# Patient Record
Sex: MALE | Race: WHITE | NOT HISPANIC OR LATINO
[De-identification: names, ages, dates, MRNs, and addresses within clinical notes are randomized per-mention and may not be internally consistent; named-entity substitution may affect disease eponyms.]

---

## 2018-07-07 ENCOUNTER — LAB SERVICES (OUTPATIENT)
Dept: OTHER | Age: 43
End: 2018-07-07

## 2018-07-07 ENCOUNTER — CHARTING TRANS (OUTPATIENT)
Dept: OTHER | Age: 43
End: 2018-07-07

## 2018-07-11 ENCOUNTER — CHARTING TRANS (OUTPATIENT)
Dept: OTHER | Age: 43
End: 2018-07-11

## 2018-07-11 LAB
APPEARANCE UR: CLEAR
BASOPHILS # BLD: 0 K/MCL (ref 0–0.3)
BASOPHILS NFR BLD: 1 %
BILIRUB UR QL: NEGATIVE
COLOR UR: YELLOW
DIFFERENTIAL METHOD BLD: ABNORMAL
EOSINOPHIL # BLD: 0.1 K/MCL (ref 0.1–0.5)
EOSINOPHIL NFR BLD: 1 %
ERYTHROCYTE [DISTWIDTH] IN BLOOD: 13.2 % (ref 11–15)
GLUCOSE UR-MCNC: NEGATIVE MG/DL
HEMATOCRIT: 59.8 % (ref 39–51)
HEMOGLOBIN: 18.4 G/DL (ref 13–17)
IMM GRANULOCYTES # BLD AUTO: 0.1 K/MCL (ref 0–0.2)
IMM GRANULOCYTES NFR BLD: 1 %
KETONES UR-MCNC: NEGATIVE MG/DL
LYMPHOCYTES # BLD: 2.2 K/MCL (ref 1–4.8)
LYMPHOCYTES NFR BLD: 28 %
MEAN CORPUSCULAR HEMOGLOBIN: 29.3 PG (ref 26–34)
MEAN CORPUSCULAR HGB CONC: 30.8 G/DL (ref 32–36.5)
MEAN CORPUSCULAR VOLUME: 95.2 FL (ref 78–100)
MONOCYTES # BLD: 0.8 K/MCL (ref 0.3–0.9)
MONOCYTES NFR BLD: 10 %
NEUTROPHILS # BLD: 4.8 K/MCL (ref 1.8–7.7)
NEUTROPHILS NFR BLD: 59 %
NITRITE UR QL: NEGATIVE
NRBC (NRBCRE): 0 /100 WBC
PH UR: 7 UNITS (ref 5–7)
PLATELET COUNT: 259 K/MCL (ref 140–450)
PROT UR QL: NEGATIVE MG/DL
RBC-URINE: NEGATIVE
RED CELL COUNT: 6.28 MIL/MCL (ref 4.5–5.9)
SERVICE CMNT-IMP: NORMAL
SP GR UR: 1.01 (ref 1–1.03)
SPECIMEN SOURCE: NORMAL
UROBILINOGEN UR QL: 0.2 MG/DL (ref 0–1)
WBC-URINE: NEGATIVE
WHITE BLOOD COUNT: 8 K/MCL (ref 4.2–11)

## 2018-07-25 ENCOUNTER — HOSPITAL (OUTPATIENT)
Dept: OTHER | Age: 43
End: 2018-07-25
Attending: FAMILY MEDICINE

## 2018-08-08 ENCOUNTER — HOSPITAL (OUTPATIENT)
Dept: OTHER | Age: 43
End: 2018-08-08
Attending: FAMILY MEDICINE

## 2018-09-10 ENCOUNTER — LAB SERVICES (OUTPATIENT)
Dept: OTHER | Age: 43
End: 2018-09-10

## 2018-09-10 ENCOUNTER — CHARTING TRANS (OUTPATIENT)
Dept: OTHER | Age: 43
End: 2018-09-10

## 2018-09-10 LAB — RAPID STREP GROUP A: POSITIVE

## 2018-09-10 ASSESSMENT — PAIN SCALES - GENERAL: PAINLEVEL_OUTOF10: 4

## 2018-10-31 VITALS
SYSTOLIC BLOOD PRESSURE: 120 MMHG | WEIGHT: 187.72 LBS | OXYGEN SATURATION: 97 % | TEMPERATURE: 98.7 F | RESPIRATION RATE: 16 BRPM | HEART RATE: 77 BPM | DIASTOLIC BLOOD PRESSURE: 78 MMHG

## 2018-11-27 VITALS
TEMPERATURE: 97.1 F | RESPIRATION RATE: 16 BRPM | HEART RATE: 67 BPM | DIASTOLIC BLOOD PRESSURE: 85 MMHG | SYSTOLIC BLOOD PRESSURE: 130 MMHG | OXYGEN SATURATION: 99 %

## 2019-01-30 ENCOUNTER — HOSPITAL (OUTPATIENT)
Dept: OTHER | Age: 44
End: 2019-01-30
Attending: FAMILY MEDICINE

## 2019-05-02 ENCOUNTER — HOSPITAL (OUTPATIENT)
Dept: OTHER | Age: 44
End: 2019-05-02
Attending: EMERGENCY MEDICINE

## 2019-05-02 LAB
ANALYZER ANC (IANC): NORMAL
ANION GAP SERPL CALC-SCNC: 13 MMOL/L (ref 10–20)
BASOPHILS # BLD: 0.1 THOUSAND/MCL (ref 0–0.3)
BASOPHILS NFR BLD: 1 %
BUN SERPL-MCNC: 16 MG/DL (ref 6–20)
BUN/CREAT SERPL: 18 (ref 7–25)
CALCIUM SERPL-MCNC: 9.7 MG/DL (ref 8.4–10.2)
CHLORIDE: 103 MMOL/L (ref 98–107)
CK SERPL-CCNC: 233 UNIT/L (ref 39–308)
CO2 SERPL-SCNC: 24 MMOL/L (ref 21–32)
CREAT SERPL-MCNC: 0.9 MG/DL (ref 0.67–1.17)
DIFFERENTIAL METHOD BLD: NORMAL
EOSINOPHIL # BLD: 0.1 THOUSAND/MCL (ref 0.1–0.5)
EOSINOPHIL NFR BLD: 2 %
ERYTHROCYTE [DISTWIDTH] IN BLOOD: 12.1 % (ref 11–15)
GLUCOSE SERPL-MCNC: 95 MG/DL (ref 65–99)
HEMATOCRIT: 45.9 % (ref 39–51)
HGB BLD-MCNC: 15.9 GM/DL (ref 13–17)
IMM GRANULOCYTES # BLD AUTO: 0 THOUSAND/MCL (ref 0–0.2)
IMM GRANULOCYTES NFR BLD: 0 %
LYMPHOCYTES # BLD: 1.8 THOUSAND/MCL (ref 1–4.8)
LYMPHOCYTES NFR BLD: 28 %
MAGNESIUM SERPL-MCNC: 1.9 MG/DL (ref 1.7–2.4)
MCH RBC QN AUTO: 30.1 PG (ref 26–34)
MCHC RBC AUTO-ENTMCNC: 34.6 GM/DL (ref 32–36.5)
MCV RBC AUTO: 86.9 FL (ref 78–100)
MONOCYTES # BLD: 0.7 THOUSAND/MCL (ref 0.3–0.9)
MONOCYTES NFR BLD: 12 %
NEUTROPHILS # BLD: 3.7 THOUSAND/MCL (ref 1.8–7.7)
NEUTROPHILS NFR BLD: 57 %
NEUTS SEG NFR BLD: NORMAL %
NRBC (NRBCRE): 0 /100 WBC
PLATELET # BLD: 305 THOUSAND/MCL (ref 140–450)
POTASSIUM SERPL-SCNC: 4.4 MMOL/L (ref 3.4–5.1)
RBC # BLD: 5.28 MILLION/MCL (ref 4.5–5.9)
SODIUM SERPL-SCNC: 136 MMOL/L (ref 135–145)
WBC # BLD: 6.4 THOUSAND/MCL (ref 4.2–11)

## 2019-10-24 RX ORDER — ERYTHROMYCIN 5 MG/G
OINTMENT OPHTHALMIC
Qty: 3.5 G | Refills: 0 | OUTPATIENT
Start: 2019-10-24

## 2019-11-07 ENCOUNTER — OFFICE VISIT (OUTPATIENT)
Dept: URGENT CARE | Age: 44
End: 2019-11-07

## 2019-11-07 VITALS
TEMPERATURE: 98.3 F | BODY MASS INDEX: 24.79 KG/M2 | HEART RATE: 84 BPM | HEIGHT: 72 IN | RESPIRATION RATE: 18 BRPM | SYSTOLIC BLOOD PRESSURE: 123 MMHG | OXYGEN SATURATION: 99 % | DIASTOLIC BLOOD PRESSURE: 77 MMHG | WEIGHT: 183 LBS

## 2019-11-07 DIAGNOSIS — J02.9 SORE THROAT: Primary | ICD-10-CM

## 2019-11-07 LAB
INTERNAL PROCEDURAL CONTROLS ACCEPTABLE: YES
S PYO AG THROAT QL IA.RAPID: NEGATIVE

## 2019-11-07 PROCEDURE — 99214 OFFICE O/P EST MOD 30 MIN: CPT | Performed by: NURSE PRACTITIONER

## 2019-11-07 PROCEDURE — 87880 STREP A ASSAY W/OPTIC: CPT | Performed by: NURSE PRACTITIONER

## 2019-11-07 RX ORDER — TESTOSTERONE CYPIONATE 200 MG/ML
INJECTION, SOLUTION INTRAMUSCULAR
Refills: 5 | COMMUNITY
Start: 2019-10-17

## 2019-11-07 RX ORDER — SYRINGE WITH NEEDLE, 1 ML 25GX5/8"
SYRINGE, EMPTY DISPOSABLE MISCELLANEOUS
Refills: 1 | COMMUNITY
Start: 2019-10-17

## 2019-11-07 RX ORDER — IBUPROFEN 200 MG
TABLET ORAL
Refills: 2 | COMMUNITY
Start: 2019-10-01

## 2019-11-07 RX ORDER — AZITHROMYCIN 250 MG/1
TABLET, FILM COATED ORAL
Qty: 6 TABLET | Refills: 0 | Status: SHIPPED | OUTPATIENT
Start: 2019-11-07 | End: 2019-11-12

## 2019-11-07 ASSESSMENT — ENCOUNTER SYMPTOMS
HEADACHES: 0
COUGH: 1
SINUS PRESSURE: 0
BACK PAIN: 0
SINUS PAIN: 0
NUMBNESS: 0
CHILLS: 0
WHEEZING: 0
SORE THROAT: 0
RHINORRHEA: 1
EYE PAIN: 0
DIZZINESS: 0
FEVER: 1
SHORTNESS OF BREATH: 0
EYE REDNESS: 0

## 2021-09-08 ENCOUNTER — HOSPITAL ENCOUNTER (OUTPATIENT)
Dept: LAB | Age: 46
Discharge: HOME | End: 2021-09-08
Attending: FAMILY MEDICINE

## 2021-09-08 DIAGNOSIS — R53.83 OTHER FATIGUE: Primary | ICD-10-CM

## 2021-09-08 LAB
ALBUMIN SERPL-MCNC: 3.5 G/DL (ref 3.6–5.1)
ALBUMIN/GLOB SERPL: 1.1 {RATIO} (ref 1–2.4)
ALP SERPL-CCNC: 74 UNITS/L (ref 45–117)
ALT SERPL-CCNC: 60 UNITS/L
ANION GAP SERPL CALC-SCNC: 9 MMOL/L (ref 10–20)
AST SERPL-CCNC: 27 UNITS/L
BASOPHILS # BLD: 0.1 K/MCL (ref 0–0.3)
BASOPHILS NFR BLD: 1 %
BILIRUB SERPL-MCNC: 0.5 MG/DL (ref 0.2–1)
BUN SERPL-MCNC: 10 MG/DL (ref 6–20)
BUN/CREAT SERPL: 10 (ref 7–25)
CALCIUM SERPL-MCNC: 8.8 MG/DL (ref 8.4–10.2)
CHLORIDE SERPL-SCNC: 107 MMOL/L (ref 98–107)
CO2 SERPL-SCNC: 28 MMOL/L (ref 21–32)
CORTIS SERPL-MCNC: 11.4 MCG/DL (ref 3.4–22.5)
CREAT SERPL-MCNC: 1.05 MG/DL (ref 0.67–1.17)
DEPRECATED RDW RBC: 43.9 FL (ref 39–50)
EOSINOPHIL # BLD: 0.1 K/MCL (ref 0–0.5)
EOSINOPHIL NFR BLD: 2 %
ERYTHROCYTE [DISTWIDTH] IN BLOOD: 13.7 % (ref 11–15)
ESTRADIOL SERPL-MCNC: 74 PG/ML
FASTING DURATION TIME PATIENT: 0 HOURS (ref 0–999)
FSH SERPL-ACNC: <0.3 MUNITS/ML (ref 1.4–18.1)
GFR SERPLBLD BASED ON 1.73 SQ M-ARVRAT: 84 ML/MIN
GLOBULIN SER-MCNC: 3.1 G/DL (ref 2–4)
GLUCOSE SERPL-MCNC: 72 MG/DL (ref 65–99)
HCT VFR BLD CALC: 49.7 % (ref 39–51)
HCYS SERPL-SCNC: 15.6 MCMOL/L
HGB BLD-MCNC: 17.3 G/DL (ref 13–17)
IMM GRANULOCYTES # BLD AUTO: 0 K/MCL (ref 0–0.2)
IMM GRANULOCYTES # BLD: 0 %
LH SERPL-ACNC: <0.1 MUNITS/ML (ref 1.5–9.3)
LYMPHOCYTES # BLD: 2.2 K/MCL (ref 1–4.8)
LYMPHOCYTES NFR BLD: 32 %
MCH RBC QN AUTO: 31 PG (ref 26–34)
MCHC RBC AUTO-ENTMCNC: 34.8 G/DL (ref 32–36.5)
MCV RBC AUTO: 89.1 FL (ref 78–100)
MONOCYTES # BLD: 0.8 K/MCL (ref 0.3–0.9)
MONOCYTES NFR BLD: 12 %
NEUTROPHILS # BLD: 3.6 K/MCL (ref 1.8–7.7)
NEUTROPHILS NFR BLD: 53 %
NRBC BLD MANUAL-RTO: 0 /100 WBC
PLATELET # BLD AUTO: 230 K/MCL (ref 140–450)
POTASSIUM SERPL-SCNC: 4.2 MMOL/L (ref 3.4–5.1)
PROLACTIN SERPL-MCNC: 7.2 NG/ML (ref 2.1–17.7)
PROT SERPL-MCNC: 6.6 G/DL (ref 6.4–8.2)
PSA SERPL-MCNC: 1.38 NG/ML
RBC # BLD: 5.58 MIL/MCL (ref 4.5–5.9)
SODIUM SERPL-SCNC: 140 MMOL/L (ref 135–145)
T3 SERPL-MCNC: 0.8 NG/ML (ref 0.6–1.81)
T3FREE SERPL-MCNC: 2.9 PG/ML (ref 2.2–4)
T4 FREE SERPL-MCNC: 1.3 NG/DL (ref 0.8–1.5)
TESTOST SERPL-MCNC: 900.5 NG/DL (ref 280–1100)
TSH SERPL-ACNC: 1.6 MCUNITS/ML (ref 0.35–5)
WBC # BLD: 6.7 K/MCL (ref 4.2–11)

## 2021-09-08 PROCEDURE — 82397 CHEMILUMINESCENT ASSAY: CPT

## 2021-09-08 PROCEDURE — 84270 ASSAY OF SEX HORMONE GLOBUL: CPT

## 2021-09-08 PROCEDURE — 84443 ASSAY THYROID STIM HORMONE: CPT

## 2021-09-08 PROCEDURE — 84403 ASSAY OF TOTAL TESTOSTERONE: CPT

## 2021-09-08 PROCEDURE — 83002 ASSAY OF GONADOTROPIN (LH): CPT

## 2021-09-08 PROCEDURE — 84305 ASSAY OF SOMATOMEDIN: CPT

## 2021-09-08 PROCEDURE — 82533 TOTAL CORTISOL: CPT

## 2021-09-08 PROCEDURE — 85025 COMPLETE CBC W/AUTO DIFF WBC: CPT

## 2021-09-08 PROCEDURE — 84439 ASSAY OF FREE THYROXINE: CPT

## 2021-09-08 PROCEDURE — 82627 DEHYDROEPIANDROSTERONE: CPT

## 2021-09-08 PROCEDURE — 84630 ASSAY OF ZINC: CPT

## 2021-09-08 PROCEDURE — 83090 ASSAY OF HOMOCYSTEINE: CPT

## 2021-09-08 PROCEDURE — 84482 T3 REVERSE: CPT

## 2021-09-08 PROCEDURE — 80053 COMPREHEN METABOLIC PANEL: CPT

## 2021-09-08 PROCEDURE — 82024 ASSAY OF ACTH: CPT

## 2021-09-08 PROCEDURE — 84146 ASSAY OF PROLACTIN: CPT

## 2021-09-08 PROCEDURE — 84153 ASSAY OF PSA TOTAL: CPT

## 2021-09-08 PROCEDURE — 84481 FREE ASSAY (FT-3): CPT

## 2021-09-08 PROCEDURE — 82670 ASSAY OF TOTAL ESTRADIOL: CPT

## 2021-09-08 PROCEDURE — 84480 ASSAY TRIIODOTHYRONINE (T3): CPT

## 2021-09-08 PROCEDURE — 36415 COLL VENOUS BLD VENIPUNCTURE: CPT

## 2021-09-09 LAB
ACTH PLAS-MCNC: 8.3 PG/ML (ref 7.2–63)
DHEA-S SERPL-MCNC: 117.3 MCG/DL (ref 24–690)
SHBG SERPL-SCNC: 30 NMOL/L (ref 11–80)

## 2021-09-10 LAB
IGF BP3 SERPL-MCNC: 5 UG/ML (ref 3.3–6.7)
IGF-I SERPL-MCNC: 167 NG/ML (ref 53.3–215)

## 2021-09-12 LAB
T3REVERSE SERPL-MCNC: 36 NG/DL (ref 9–27)
ZINC RBC-MCNC: 1219.2 MCG/DL (ref 794–1470)

## 2021-09-13 LAB
TESTOST FREE SERPL-MCNC: 169.1 PG/ML (ref 47–244)
TESTOST SERPL-MCNC: 945.7 NG/DL (ref 300–890)

## 2021-10-01 ENCOUNTER — MED REC SCAN ONLY (OUTPATIENT)
Dept: INTEGRATIVE MEDICINE | Facility: CLINIC | Age: 46
End: 2021-10-01

## 2021-10-01 ENCOUNTER — OFFICE VISIT (OUTPATIENT)
Dept: INTEGRATIVE MEDICINE | Facility: CLINIC | Age: 46
End: 2021-10-01
Payer: COMMERCIAL

## 2021-10-01 VITALS
BODY MASS INDEX: 24.92 KG/M2 | DIASTOLIC BLOOD PRESSURE: 84 MMHG | HEIGHT: 72 IN | WEIGHT: 184 LBS | OXYGEN SATURATION: 98 % | SYSTOLIC BLOOD PRESSURE: 124 MMHG | HEART RATE: 81 BPM

## 2021-10-01 DIAGNOSIS — E34.8 ENDOCRINE AXIS DYSFUNCTION: Primary | ICD-10-CM

## 2021-10-01 DIAGNOSIS — E78.5 HYPERLIPIDEMIA, UNSPECIFIED HYPERLIPIDEMIA TYPE: ICD-10-CM

## 2021-10-01 PROCEDURE — 3079F DIAST BP 80-89 MM HG: CPT | Performed by: FAMILY MEDICINE

## 2021-10-01 PROCEDURE — 99204 OFFICE O/P NEW MOD 45 MIN: CPT | Performed by: FAMILY MEDICINE

## 2021-10-01 PROCEDURE — 3074F SYST BP LT 130 MM HG: CPT | Performed by: FAMILY MEDICINE

## 2021-10-01 PROCEDURE — 3008F BODY MASS INDEX DOCD: CPT | Performed by: FAMILY MEDICINE

## 2021-10-01 RX ORDER — LIQUID BASE NO.223
POWDER (GRAM) MISCELLANEOUS
COMMUNITY

## 2021-10-01 RX ORDER — ROSUVASTATIN CALCIUM 5 MG/1
5 TABLET, COATED ORAL NIGHTLY
Qty: 90 TABLET | Refills: 0 | Status: SHIPPED | OUTPATIENT
Start: 2021-10-01 | End: 2021-12-30

## 2021-10-01 RX ORDER — CHOLESTYRAMINE
POWDER (GRAM) MISCELLANEOUS
COMMUNITY
End: 2022-01-27

## 2021-10-01 RX ORDER — TESTOSTERONE CYPIONATE 200 MG/ML
INJECTION INTRAMUSCULAR
Qty: 1 EACH | Refills: 0 | Status: SHIPPED | OUTPATIENT
Start: 2021-10-01 | End: 2021-10-12

## 2021-10-01 RX ORDER — TESTOSTERONE CYPIONATE 200 MG/ML
INJECTION INTRAMUSCULAR
COMMUNITY
Start: 2021-07-19 | End: 2021-10-01 | Stop reason: CLARIF

## 2021-10-01 RX ORDER — ANASTROZOLE 1 MG/1
TABLET ORAL
Qty: 24 TABLET | Refills: 0 | Status: SHIPPED | OUTPATIENT
Start: 2021-10-01

## 2021-10-01 NOTE — PROGRESS NOTES
Ama Oliver is a 51year old male.   Patient presents with:  Establish Care: previous pt    Cholesterol: f/u on cholesterol treatment    Follow - Up: Hormone f/u , mold toxicity       HPI:   39-year-old male with history of Guillain-Barré syndrome, e heartburn, nausea and vomiting. Genitourinary: Negative for dysuria, frequency, hematuria and urgency. Musculoskeletal: Positive for myalgias.    Neurological: Negative for dizziness, tingling, speech change, loss of consciousness, weakness and headache Resource Strain:       Difficulty of Paying Living Expenses: Not on file  Food Insecurity:       Worried About Running Out of Food in the Last Year: Not on file      Ran Out of Food in the Last Year: Not on file  Transportation Needs:       Lack of Transpo movements intact. Conjunctiva/sclera: Conjunctivae normal.      Pupils: Pupils are equal, round, and reactive to light. Cardiovascular:      Rate and Rhythm: Normal rate and regular rhythm. Heart sounds: Normal heart sounds. No murmur heard. units daily   Methyl cpg - 1 tab daily   Nattokinase - 100 mg daily   Cholestepure - 1 serving daily   Reheck lipid in before next visit     Immune/Inflammtion: history of GBS with continued lower ext symptoms and fatigue.  H/o water damage related mold exp before starting any supplement, dietary, or exercise program, especially if you are pregnant or have any pre-existing injuries or medical conditions.  The patient agrees that the [facility] and its affiliates and its [facility] Cli

## 2021-10-01 NOTE — PATIENT INSTRUCTIONS
Trt Protocol:    Testosterone Cyponate 200 mg/ml 0 0.3 mg IM every 5 days   units subcutaneous 2 days before tesotosterone (HCG 10,000 unit vial)  Add Anastrozole 0.5 mg twice weekly for 3 months     Supplement/Nutrient Support:    Estrodim Orthomol Additional Information:     Follow up in 6 weeks

## 2021-10-12 RX ORDER — TESTOSTERONE CYPIONATE 200 MG/ML
INJECTION INTRAMUSCULAR
Qty: 6 EACH | Refills: 2 | Status: SHIPPED | OUTPATIENT
Start: 2021-10-12

## 2021-10-12 NOTE — TELEPHONE ENCOUNTER
A refill request was received for:  Requested Prescriptions     Pending Prescriptions Disp Refills   • Testosterone cypionate 200 MG/ML Intramuscular Solution 1 each 0     Si.35 ml or 70 mg IM every 5 days     Last refill date: historical  Qty:  Last o

## 2021-11-12 ENCOUNTER — HOSPITAL ENCOUNTER (OUTPATIENT)
Dept: LAB | Age: 46
Discharge: HOME | End: 2021-11-12
Attending: FAMILY MEDICINE

## 2021-11-12 DIAGNOSIS — E34.8 OTHER SPECIFIED ENDOCRINE DISORDERS: Primary | ICD-10-CM

## 2021-11-12 DIAGNOSIS — E78.5 HYPERLIPOPROTEINEMIA: ICD-10-CM

## 2021-11-12 DIAGNOSIS — Z00.00 ROUTINE GENERAL MEDICAL EXAMINATION AT A HEALTH CARE FACILITY: ICD-10-CM

## 2021-11-12 DIAGNOSIS — G93.32 CHRONIC FATIGUE SYNDROME: ICD-10-CM

## 2021-11-12 LAB
25(OH)D3+25(OH)D2 SERPL-MCNC: 89.4 NG/ML (ref 30–100)
ALBUMIN SERPL-MCNC: 3.7 G/DL (ref 3.6–5.1)
ALBUMIN/GLOB SERPL: 1.2 {RATIO} (ref 1–2.4)
ALP SERPL-CCNC: 79 UNITS/L (ref 45–117)
ALT SERPL-CCNC: 119 UNITS/L
ANION GAP SERPL CALC-SCNC: 10 MMOL/L (ref 10–20)
AST SERPL-CCNC: 63 UNITS/L
BASOPHILS # BLD: 0.1 K/MCL (ref 0–0.3)
BASOPHILS NFR BLD: 1 %
BILIRUB SERPL-MCNC: 0.4 MG/DL (ref 0.2–1)
BUN SERPL-MCNC: 10 MG/DL (ref 6–20)
BUN/CREAT SERPL: 10 (ref 7–25)
CALCIUM SERPL-MCNC: 9.1 MG/DL (ref 8.4–10.2)
CHLORIDE SERPL-SCNC: 108 MMOL/L (ref 98–107)
CHOLEST SERPL-MCNC: 189 MG/DL
CHOLEST/HDLC SERPL: 4.6 {RATIO}
CO2 SERPL-SCNC: 25 MMOL/L (ref 21–32)
CORTIS AM PEAK SERPL-MCNC: 14.4 MCG/DL (ref 5.2–22.5)
CREAT SERPL-MCNC: 0.98 MG/DL (ref 0.67–1.17)
CRP SERPL-MCNC: <0.3 MG/DL
DEPRECATED RDW RBC: 37.2 FL (ref 39–50)
EOSINOPHIL # BLD: 0.1 K/MCL (ref 0–0.5)
EOSINOPHIL NFR BLD: 2 %
ERYTHROCYTE [DISTWIDTH] IN BLOOD: 11.7 % (ref 11–15)
ESTRADIOL SERPL-MCNC: 41 PG/ML
FASTING DURATION TIME PATIENT: 12 HOURS (ref 0–999)
FASTING DURATION TIME PATIENT: 12 HOURS (ref 0–999)
FSH SERPL-ACNC: <0.3 MUNITS/ML (ref 1.4–18.1)
GFR SERPLBLD BASED ON 1.73 SQ M-ARVRAT: >90 ML/MIN
GLOBULIN SER-MCNC: 3.2 G/DL (ref 2–4)
GLUCOSE SERPL-MCNC: 95 MG/DL (ref 70–99)
HCT VFR BLD CALC: 52.1 % (ref 39–51)
HCYS SERPL-SCNC: 5.5 MCMOL/L
HDLC SERPL-MCNC: 41 MG/DL
HGB BLD-MCNC: 18 G/DL (ref 13–17)
IMM GRANULOCYTES # BLD AUTO: 0 K/MCL (ref 0–0.2)
IMM GRANULOCYTES # BLD: 1 %
LDLC SERPL CALC-MCNC: 102 MG/DL
LH SERPL-ACNC: <0.1 MUNITS/ML (ref 1.5–9.3)
LYMPHOCYTES # BLD: 2.2 K/MCL (ref 1–4.8)
LYMPHOCYTES NFR BLD: 35 %
MCH RBC QN AUTO: 30.2 PG (ref 26–34)
MCHC RBC AUTO-ENTMCNC: 34.5 G/DL (ref 32–36.5)
MCV RBC AUTO: 87.4 FL (ref 78–100)
MONOCYTES # BLD: 0.6 K/MCL (ref 0.3–0.9)
MONOCYTES NFR BLD: 10 %
NEUTROPHILS # BLD: 3.3 K/MCL (ref 1.8–7.7)
NEUTROPHILS NFR BLD: 51 %
NONHDLC SERPL-MCNC: 148 MG/DL
NRBC BLD MANUAL-RTO: 0 /100 WBC
PLATELET # BLD AUTO: 260 K/MCL (ref 140–450)
POTASSIUM SERPL-SCNC: 4.3 MMOL/L (ref 3.4–5.1)
PROLACTIN SERPL-MCNC: 5.2 NG/ML (ref 2.1–17.7)
PROT SERPL-MCNC: 6.9 G/DL (ref 6.4–8.2)
PSA SERPL-MCNC: 1.13 NG/ML
RBC # BLD: 5.96 MIL/MCL (ref 4.5–5.9)
SODIUM SERPL-SCNC: 139 MMOL/L (ref 135–145)
T3 SERPL-MCNC: 0.95 NG/ML (ref 0.6–1.81)
T3FREE SERPL-MCNC: 2.8 PG/ML (ref 2.2–4)
T4 FREE SERPL-MCNC: 1.1 NG/DL (ref 0.8–1.5)
TRIGL SERPL-MCNC: 229 MG/DL
TSH SERPL-ACNC: 1.28 MCUNITS/ML (ref 0.35–5)
WBC # BLD: 6.2 K/MCL (ref 4.2–11)

## 2021-11-12 PROCEDURE — 84403 ASSAY OF TOTAL TESTOSTERONE: CPT

## 2021-11-12 PROCEDURE — 80061 LIPID PANEL: CPT

## 2021-11-12 PROCEDURE — 83001 ASSAY OF GONADOTROPIN (FSH): CPT

## 2021-11-12 PROCEDURE — 84481 FREE ASSAY (FT-3): CPT

## 2021-11-12 PROCEDURE — 84402 ASSAY OF FREE TESTOSTERONE: CPT

## 2021-11-12 PROCEDURE — 84480 ASSAY TRIIODOTHYRONINE (T3): CPT

## 2021-11-12 PROCEDURE — 82670 ASSAY OF TOTAL ESTRADIOL: CPT

## 2021-11-12 PROCEDURE — 86140 C-REACTIVE PROTEIN: CPT

## 2021-11-12 PROCEDURE — 82306 VITAMIN D 25 HYDROXY: CPT

## 2021-11-12 PROCEDURE — 85025 COMPLETE CBC W/AUTO DIFF WBC: CPT

## 2021-11-12 PROCEDURE — 83090 ASSAY OF HOMOCYSTEINE: CPT

## 2021-11-12 PROCEDURE — 84443 ASSAY THYROID STIM HORMONE: CPT

## 2021-11-12 PROCEDURE — 82642 DIHYDROTESTOSTERONE: CPT

## 2021-11-12 PROCEDURE — 84270 ASSAY OF SEX HORMONE GLOBUL: CPT

## 2021-11-12 PROCEDURE — 80053 COMPREHEN METABOLIC PANEL: CPT

## 2021-11-12 PROCEDURE — 83520 IMMUNOASSAY QUANT NOS NONAB: CPT

## 2021-11-12 PROCEDURE — 84153 ASSAY OF PSA TOTAL: CPT

## 2021-11-12 PROCEDURE — 82024 ASSAY OF ACTH: CPT

## 2021-11-12 PROCEDURE — 36415 COLL VENOUS BLD VENIPUNCTURE: CPT

## 2021-11-12 PROCEDURE — 84439 ASSAY OF FREE THYROXINE: CPT

## 2021-11-12 PROCEDURE — 84146 ASSAY OF PROLACTIN: CPT

## 2021-11-12 PROCEDURE — 82533 TOTAL CORTISOL: CPT

## 2021-11-13 LAB
SHBG SERPL-SCNC: 36 NMOL/L (ref 11–80)
TESTOST FREE MFR SERPL: 2 % (ref 1.6–2.9)
TESTOST FREE SERPL-MCNC: 149 PG/ML (ref 47–244)
TESTOST SERPL-MCNC: 755.5 NG/DL (ref 280–1100)
TESTOSTERONE.FREE+WB SERPL-MCNC: 402 NG/DL (ref 131–682)

## 2021-11-15 ENCOUNTER — TELEPHONE (OUTPATIENT)
Dept: INTEGRATIVE MEDICINE | Facility: CLINIC | Age: 46
End: 2021-11-15

## 2021-11-15 LAB — ACTH PLAS-MCNC: 28.3 PG/ML (ref 7.2–63)

## 2021-11-15 NOTE — TELEPHONE ENCOUNTER
Received labs from [facility] for labs ordered 11/9/21. Report placed on provider's desk for review.

## 2021-11-16 LAB — BIG IGF-II SERPL-MCNC: 586 NG/ML

## 2021-11-17 LAB — ANDROSTANOLONE SERPL-MCNC: 1075.1 PG/ML (ref 106–719)

## 2021-11-18 LAB
TESTOST FREE SERPL-MCNC: 165.5 PG/ML (ref 47–244)
TESTOST SERPL-MCNC: 901.1 NG/DL (ref 300–890)

## 2021-11-26 ENCOUNTER — TELEMEDICINE (OUTPATIENT)
Dept: INTEGRATIVE MEDICINE | Facility: CLINIC | Age: 46
End: 2021-11-26

## 2021-11-26 DIAGNOSIS — E78.5 HYPERLIPIDEMIA, UNSPECIFIED HYPERLIPIDEMIA TYPE: ICD-10-CM

## 2021-11-26 DIAGNOSIS — E34.8 ENDOCRINE AXIS DYSFUNCTION: Primary | ICD-10-CM

## 2021-11-26 DIAGNOSIS — R74.8 ELEVATED LIVER ENZYMES: ICD-10-CM

## 2021-11-26 PROCEDURE — 99214 OFFICE O/P EST MOD 30 MIN: CPT | Performed by: FAMILY MEDICINE

## 2021-11-26 NOTE — PATIENT INSTRUCTIONS
Medication:    Reduce testosterone to 0.32 mg every 5 days  Continue anastrozole and hCG at current doses      Supplement/Nutrient Support:        Prostate [place] by [facility]   Dose: 2 tabs once daily         Tomorrow’s Nutrition Sunfiber® deliv kind, expressed or implied, as to the Products, including, but not limited to its efficacy, benefits or outcomes. Patient agrees to contact his/her healthcare professional and stop use of Products should any reactions arise.       Diet Recommendations: cut-up raw or cooked vegetable  · Half a cup vegetable juice  Best choices: Fresh or frozen vegetables prepared without added salt or fat.    Fruits  Servings: 4 to 5 a day  A serving is:  · 1 medium fruit  · One-quarter cup dried fruit  · Half a cup fresh, educational content on 7/1/2019  © 5157-3714 [employer]. All rights reserved. This information is not intended as a substitute for professional medical care. Always follow your healthcare professional's instructions.

## 2021-11-26 NOTE — PROGRESS NOTES
Jacob Polanco is a 51year old male. Patient presents with: Follow - Up: medication treatment , hormone therapy , cholesterol       HPI:   Follow-up for fatigue and endocrine dysfunction.     Currently on testosterone replacement therapy and here to HISTORY:     Past Medical History:   Diagnosis Date   • Anxiety    • Guillain-[place] ([facility] [address]    • High cholesterol    • Hyperlipidemia    • Low testosterone        CURRENT MEDICATIONS:     Current Outpatient Medications   Medication Sig Dispense Refill   • Te PHYSICAL EXAM:   There were no vitals filed for this visit. Physical Exam  Constitutional:       Appearance: Normal appearance. Neurological:      Mental Status: He is alert.    Psychiatric:         Mood and Affect: Mood normal.         Thought C xenia  Follow-up in 3 months      Time spent with patient: 30 minutes in direct communication explaining the rationale for treatment, creating unique care plan in EPIC, reviewing SE and overall treatment plan.  Over 50% of this time was in education, fabian

## 2021-11-26 NOTE — PATIENT INSTRUCTIONS
Medication:    Reduce testosterone to 0.32 mg every 5 days  Continue anastrozole and hCG at current doses      Supplement/Nutrient Support:        Prostate [place] by [facility]   Dose: 2 tabs once daily         Tomorrow’s Nutrition Sunfiber® deliv kind, expressed or implied, as to the Products, including, but not limited to its efficacy, benefits or outcomes. Patient agrees to contact his/her healthcare professional and stop use of Products should any reactions arise.       Diet Recommendations: cut-up raw or cooked vegetable  · Half a cup vegetable juice  Best choices: Fresh or frozen vegetables prepared without added salt or fat.    Fruits  Servings: 4 to 5 a day  A serving is:  · 1 medium fruit  · One-quarter cup dried fruit  · Half a cup fresh, educational content on 7/1/2019  © 1879-7037 [employer]. All rights reserved. This information is not intended as a substitute for professional medical care. Always follow your healthcare professional's instructions.

## 2021-11-29 ENCOUNTER — TELEPHONE (OUTPATIENT)
Dept: INTEGRATIVE MEDICINE | Facility: CLINIC | Age: 46
End: 2021-11-29

## 2021-11-29 NOTE — TELEPHONE ENCOUNTER
Isamar Fulton, ELLEN [facility]    Follow up in 3 months (around 2/26/2022). [email or web address] message sent to patient.

## 2021-12-21 ENCOUNTER — HOSPITAL ENCOUNTER (OUTPATIENT)
Dept: ULTRASOUND IMAGING | Age: 46
Discharge: HOME | End: 2021-12-21
Attending: FAMILY MEDICINE
Payer: COMMERCIAL

## 2021-12-21 DIAGNOSIS — R74.8 ELEVATED LIVER ENZYMES: ICD-10-CM

## 2021-12-21 DIAGNOSIS — E78.5 HYPERLIPIDEMIA, UNSPECIFIED HYPERLIPIDEMIA TYPE: ICD-10-CM

## 2021-12-21 PROCEDURE — 76705 ECHO EXAM OF ABDOMEN: CPT | Performed by: FAMILY MEDICINE

## 2022-01-27 ENCOUNTER — TELEMEDICINE (OUTPATIENT)
Dept: FAMILY MEDICINE CLINIC | Facility: CLINIC | Age: 47
End: 2022-01-27
Payer: COMMERCIAL

## 2022-01-27 DIAGNOSIS — E78.2 MIXED HYPERLIPIDEMIA: Primary | ICD-10-CM

## 2022-01-27 PROCEDURE — 99213 OFFICE O/P EST LOW 20 MIN: CPT | Performed by: NURSE PRACTITIONER

## 2022-01-27 RX ORDER — CHOLESTYRAMINE 4 G/9G
4 POWDER, FOR SUSPENSION ORAL DAILY
Qty: 90 PACKET | Refills: 0 | Status: SHIPPED | OUTPATIENT
Start: 2022-01-27 | End: 2022-04-27

## 2022-01-27 NOTE — PROGRESS NOTES
Due to the real risk of possible exposure to Coronavirus (CoV-2, COVID-19) in the office/medical building and recommendations for social distancing (key to mitigation/limiting the spread of the virus) a Virtual or Telemedicine visit over the phone was perf above.  Coding/billing information is submitted for this visit based on complexity of care and/or time spent for the visit. HPI:  Patient presents with:   Follow - Up: Cholesterol medication      Radha Ramirez is a 51year old male who calls for com Assessment and Plan:  1. Mixed hyperlipidemia  -Will refill. Labs including lipids and LFTs ordered by Dr. Kristel Woods. Follow-up 1 month as scheduled. - Cholestyramine 4 GM/DOSE Oral Powder; Take 1 packet (4 g total) by mouth daily.   Dispense: 90 packet; R

## 2022-02-14 ENCOUNTER — TELEPHONE (OUTPATIENT)
Dept: INTEGRATIVE MEDICINE | Facility: CLINIC | Age: 47
End: 2022-02-14

## 2022-02-14 NOTE — TELEPHONE ENCOUNTER
Request labs:    Testosterone, Free and Total.   T3,T4,Free T4   TSH   CBC, with DIFF/PLT, specifically Hemoglobin / Hemocrit with EGFR.    Sex Hormone Binding Globuline   Homocysteine   Estradiol (please order the ULTRA Sensitive test)   Vitamin D, 25-Hydroxy   Prolactin   C-Reactive Protein Quant   Thyroid Panel   Basic Metabolic panel   Lipid Panel   DHEA-S   Prostate Specific AG Serum (PSA)   LH and FSH   IGF-1   Growth Hormone Releasing Hormone;   Cortisol   ACTH   Glucose Tests   IGF BP-3

## 2022-02-22 LAB
ACTH, PLASMA: 29 PG/ML (ref 6–50)
CARDIO CRP(R): 0.9 MG/L
CHOL/HDLC RATIO: 5.4 (CALC)
CHOLESTEROL, TOTAL: 204 MG/DL
CORTISOL, TOTAL: 18 MCG/DL
DHEA SULFATE: 100 MCG/DL (ref 61–442)
ESTRADIOL, ULTRASENSITIVE$/MS/MS: 24 PG/ML
FSH: <0.7 MIU/ML (ref 1.6–8)
HDL CHOLESTEROL: 38 MG/DL
IGF I, /MS: 239 NG/ML (ref 52–328)
IGFBP-3: 6 MG/L (ref 3.3–6.7)
LDL-CHOLESTEROL: 128 MG/DL (CALC)
LH: 0.2 MIU/ML (ref 1.5–9.3)
NON-HDL CHOLESTEROL: 166 MG/DL (CALC)
PROLACTIN: 4.6 NG/ML (ref 2–18)
PSA, TOTAL: 0.9 NG/ML
TRIGLYCERIDES: 238 MG/DL
Z SCORE (MALE): 1.2 SD

## 2022-03-12 ENCOUNTER — HOSPITAL ENCOUNTER (OUTPATIENT)
Age: 47
Discharge: HOME | End: 2022-03-12
Payer: COMMERCIAL

## 2022-03-12 VITALS
OXYGEN SATURATION: 99 % | SYSTOLIC BLOOD PRESSURE: 134 MMHG | HEART RATE: 77 BPM | DIASTOLIC BLOOD PRESSURE: 89 MMHG | TEMPERATURE: 98 F | RESPIRATION RATE: 18 BRPM

## 2022-03-12 DIAGNOSIS — Z20.822 ENCOUNTER FOR LABORATORY TESTING FOR COVID-19 VIRUS: ICD-10-CM

## 2022-03-12 DIAGNOSIS — Z20.822 LAB TEST NEGATIVE FOR COVID-19 VIRUS: ICD-10-CM

## 2022-03-12 DIAGNOSIS — B34.9 VIRAL ILLNESS: Primary | ICD-10-CM

## 2022-03-12 LAB — SARS-COV-2 RNA RESP QL NAA+PROBE: NOT DETECTED

## 2022-03-12 PROCEDURE — U0002 COVID-19 LAB TEST NON-CDC: HCPCS | Performed by: NURSE PRACTITIONER

## 2022-03-12 PROCEDURE — 99213 OFFICE O/P EST LOW 20 MIN: CPT | Performed by: NURSE PRACTITIONER

## 2022-03-12 NOTE — ED INITIAL ASSESSMENT (HPI)
Pt came in due to cough, congestion, and runny nose for the past 2 days. Pt denies any sob, cp, nvd, ha, or any dizziness. Pt has easy non labored respirations. Pt is fully verbal and ambulatory.

## 2022-05-24 ENCOUNTER — OFFICE VISIT (OUTPATIENT)
Dept: INTEGRATIVE MEDICINE | Facility: CLINIC | Age: 47
End: 2022-05-24
Payer: COMMERCIAL

## 2022-05-24 VITALS
HEART RATE: 84 BPM | BODY MASS INDEX: 24.6 KG/M2 | OXYGEN SATURATION: 98 % | WEIGHT: 181.63 LBS | SYSTOLIC BLOOD PRESSURE: 138 MMHG | HEIGHT: 72 IN | DIASTOLIC BLOOD PRESSURE: 84 MMHG

## 2022-05-24 DIAGNOSIS — Z12.11 ENCOUNTER FOR SCREENING COLONOSCOPY: ICD-10-CM

## 2022-05-24 DIAGNOSIS — R53.82 CHRONIC FATIGUE: ICD-10-CM

## 2022-05-24 DIAGNOSIS — R74.8 ELEVATED LIVER ENZYMES: ICD-10-CM

## 2022-05-24 DIAGNOSIS — E78.5 HYPERLIPIDEMIA, UNSPECIFIED HYPERLIPIDEMIA TYPE: ICD-10-CM

## 2022-05-24 DIAGNOSIS — E34.8 ENDOCRINE AXIS DYSFUNCTION: ICD-10-CM

## 2022-05-24 DIAGNOSIS — M51.9 LUMBAR DISC DISEASE: ICD-10-CM

## 2022-05-24 DIAGNOSIS — R79.89 ELEVATED HOMOCYSTEINE: ICD-10-CM

## 2022-05-24 DIAGNOSIS — G61.0 GUILLAIN-BARRE SYNDROME (HCC): Primary | ICD-10-CM

## 2022-05-24 PROCEDURE — 3075F SYST BP GE 130 - 139MM HG: CPT | Performed by: FAMILY MEDICINE

## 2022-05-24 PROCEDURE — 3079F DIAST BP 80-89 MM HG: CPT | Performed by: FAMILY MEDICINE

## 2022-05-24 PROCEDURE — 3008F BODY MASS INDEX DOCD: CPT | Performed by: FAMILY MEDICINE

## 2022-05-24 PROCEDURE — 99214 OFFICE O/P EST MOD 30 MIN: CPT | Performed by: FAMILY MEDICINE

## 2022-05-24 RX ORDER — FLUOXETINE 10 MG/1
10 CAPSULE ORAL DAILY
Qty: 30 CAPSULE | Refills: 1 | Status: SHIPPED | OUTPATIENT
Start: 2022-05-24

## 2022-06-10 ENCOUNTER — LABORATORY ENCOUNTER (OUTPATIENT)
Dept: LAB | Age: 47
End: 2022-06-10
Attending: FAMILY MEDICINE
Payer: COMMERCIAL

## 2022-06-10 DIAGNOSIS — R53.82 CHRONIC FATIGUE: ICD-10-CM

## 2022-06-10 DIAGNOSIS — E34.8 OTHER SPECIFIED ENDOCRINE DISORDERS: ICD-10-CM

## 2022-06-10 DIAGNOSIS — G61.0 ACUTE INFECTIVE POLYNEURITIS (HCC): Primary | ICD-10-CM

## 2022-06-10 DIAGNOSIS — E78.5 HYPERLIPEMIA: ICD-10-CM

## 2022-06-10 DIAGNOSIS — E78.5 HYPERLIPIDEMIA, UNSPECIFIED HYPERLIPIDEMIA TYPE: ICD-10-CM

## 2022-06-10 DIAGNOSIS — R53.82 CHRONIC FATIGUE SYNDROME: ICD-10-CM

## 2022-06-10 DIAGNOSIS — G61.0 GUILLAIN-BARRE SYNDROME (HCC): ICD-10-CM

## 2022-06-10 DIAGNOSIS — E34.8 ENDOCRINE AXIS DYSFUNCTION: ICD-10-CM

## 2022-06-10 LAB
ALBUMIN SERPL-MCNC: 3.7 G/DL (ref 3.4–5)
ALBUMIN/GLOB SERPL: 1.1 {RATIO} (ref 1–2)
ALP LIVER SERPL-CCNC: 65 U/L
ALT SERPL-CCNC: 60 U/L
ANION GAP SERPL CALC-SCNC: 9 MMOL/L (ref 0–18)
AST SERPL-CCNC: 27 U/L (ref 15–37)
BASOPHILS # BLD AUTO: 0.04 X10(3) UL (ref 0–0.2)
BASOPHILS NFR BLD AUTO: 0.7 %
BILIRUB SERPL-MCNC: 0.5 MG/DL (ref 0.1–2)
BUN BLD-MCNC: 18 MG/DL (ref 7–18)
BUN/CREAT SERPL: 15.8 (ref 10–20)
CALCIUM BLD-MCNC: 9.2 MG/DL (ref 8.5–10.1)
CHLORIDE SERPL-SCNC: 109 MMOL/L (ref 98–112)
CHOLEST SERPL-MCNC: 217 MG/DL (ref ?–200)
CO2 SERPL-SCNC: 23 MMOL/L (ref 21–32)
CORTIS SERPL-MCNC: 19.8 UG/DL
CREAT BLD-MCNC: 1.14 MG/DL
CRP SERPL HS-MCNC: 1.42 MG/L (ref ?–3)
DEPRECATED RDW RBC AUTO: 38.2 FL (ref 35.1–46.3)
DHEA-S SERPL-MCNC: 120.4 UG/DL
EOSINOPHIL # BLD AUTO: 0.13 X10(3) UL (ref 0–0.7)
EOSINOPHIL NFR BLD AUTO: 2.3 %
ERYTHROCYTE [DISTWIDTH] IN BLOOD BY AUTOMATED COUNT: 11.9 % (ref 11–15)
ESTRADIOL SERPL-MCNC: 30.7 PG/ML
FASTING PATIENT LIPID ANSWER: YES
FASTING STATUS PATIENT QL REPORTED: YES
GLOBULIN PLAS-MCNC: 3.4 G/DL (ref 2.8–4.4)
GLUCOSE BLD-MCNC: 100 MG/DL (ref 70–99)
HCT VFR BLD AUTO: 50.9 %
HCYS SERPL-SCNC: 7.3 UMOL/L (ref 3.2–10.7)
HDLC SERPL-MCNC: 37 MG/DL (ref 40–59)
HGB BLD-MCNC: 17.5 G/DL
IMM GRANULOCYTES # BLD AUTO: 0.02 X10(3) UL (ref 0–1)
IMM GRANULOCYTES NFR BLD: 0.4 %
LDLC SERPL CALC-MCNC: 141 MG/DL (ref ?–100)
LYMPHOCYTES # BLD AUTO: 1.83 X10(3) UL (ref 1–4)
LYMPHOCYTES NFR BLD AUTO: 32.9 %
MCH RBC QN AUTO: 30 PG (ref 26–34)
MCHC RBC AUTO-ENTMCNC: 34.4 G/DL (ref 31–37)
MCV RBC AUTO: 87.2 FL
MONOCYTES # BLD AUTO: 0.53 X10(3) UL (ref 0.1–1)
MONOCYTES NFR BLD AUTO: 9.5 %
NEUTROPHILS # BLD AUTO: 3.02 X10 (3) UL (ref 1.5–7.7)
NEUTROPHILS # BLD AUTO: 3.02 X10(3) UL (ref 1.5–7.7)
NEUTROPHILS NFR BLD AUTO: 54.2 %
NONHDLC SERPL-MCNC: 180 MG/DL (ref ?–130)
OSMOLALITY SERPL CALC.SUM OF ELEC: 294 MOSM/KG (ref 275–295)
PLATELET # BLD AUTO: 254 10(3)UL (ref 150–450)
POTASSIUM SERPL-SCNC: 4.4 MMOL/L (ref 3.5–5.1)
PROLACTIN SERPL-MCNC: 6.8 NG/ML
PROT SERPL-MCNC: 7.1 G/DL (ref 6.4–8.2)
PSA SERPL-MCNC: 1.16 NG/ML (ref ?–4)
RBC # BLD AUTO: 5.84 X10(6)UL
SODIUM SERPL-SCNC: 141 MMOL/L (ref 136–145)
T3FREE SERPL-MCNC: 3.11 PG/ML (ref 2.4–4.2)
T4 FREE SERPL-MCNC: 1.2 NG/DL (ref 0.8–1.7)
TRIGL SERPL-MCNC: 218 MG/DL (ref 30–149)
TSI SER-ACNC: 1.13 MIU/ML (ref 0.36–3.74)
VIT D+METAB SERPL-MCNC: 85.3 NG/ML (ref 30–100)
VLDLC SERPL CALC-MCNC: 41 MG/DL (ref 0–30)
WBC # BLD AUTO: 5.6 X10(3) UL (ref 4–11)

## 2022-06-10 PROCEDURE — 84402 ASSAY OF FREE TESTOSTERONE: CPT

## 2022-06-10 PROCEDURE — 82397 CHEMILUMINESCENT ASSAY: CPT

## 2022-06-10 PROCEDURE — 84153 ASSAY OF PSA TOTAL: CPT | Performed by: FAMILY MEDICINE

## 2022-06-10 PROCEDURE — 84443 ASSAY THYROID STIM HORMONE: CPT | Performed by: FAMILY MEDICINE

## 2022-06-10 PROCEDURE — 82306 VITAMIN D 25 HYDROXY: CPT

## 2022-06-10 PROCEDURE — 80061 LIPID PANEL: CPT

## 2022-06-10 PROCEDURE — 82533 TOTAL CORTISOL: CPT | Performed by: FAMILY MEDICINE

## 2022-06-10 PROCEDURE — 84439 ASSAY OF FREE THYROXINE: CPT | Performed by: FAMILY MEDICINE

## 2022-06-10 PROCEDURE — 84146 ASSAY OF PROLACTIN: CPT | Performed by: FAMILY MEDICINE

## 2022-06-10 PROCEDURE — 84481 FREE ASSAY (FT-3): CPT | Performed by: FAMILY MEDICINE

## 2022-06-10 PROCEDURE — 84305 ASSAY OF SOMATOMEDIN: CPT

## 2022-06-10 PROCEDURE — 86141 C-REACTIVE PROTEIN HS: CPT | Performed by: FAMILY MEDICINE

## 2022-06-10 PROCEDURE — 85025 COMPLETE CBC W/AUTO DIFF WBC: CPT

## 2022-06-10 PROCEDURE — 84482 T3 REVERSE: CPT

## 2022-06-10 PROCEDURE — 82670 ASSAY OF TOTAL ESTRADIOL: CPT

## 2022-06-10 PROCEDURE — 36415 COLL VENOUS BLD VENIPUNCTURE: CPT

## 2022-06-10 PROCEDURE — 83090 ASSAY OF HOMOCYSTEINE: CPT

## 2022-06-10 PROCEDURE — 82627 DEHYDROEPIANDROSTERONE: CPT | Performed by: FAMILY MEDICINE

## 2022-06-10 PROCEDURE — 80053 COMPREHEN METABOLIC PANEL: CPT | Performed by: FAMILY MEDICINE

## 2022-06-10 PROCEDURE — 84270 ASSAY OF SEX HORMONE GLOBUL: CPT

## 2022-06-10 PROCEDURE — 84403 ASSAY OF TOTAL TESTOSTERONE: CPT

## 2022-06-12 LAB
IGF 1 Z SCORE CALCULATION: 1.8
IGF BINDING PROTEIN 3: 5760 NG/ML
IGF-1 (INSULINE-LIKE GROWTH FACTOR 1): 225 NG/ML
SEX HORMONE BINDING GLOBULIN: 36 NMOL/L
TESTOSTERONE, ADULT, MALE: 726 NG/DL
TESTOSTERONE, BIOAVAILABLE: 385 NG/DL
TESTOSTERONE, FREE, CALC: 141 PG/ML
TESTOSTERONE, PERCENT FREE: 1.9 %

## 2022-06-14 LAB
TESTOSTERONE, FREE, S: 28.5 NG/DL
TESTOSTERONE, TOTAL, S: 793 NG/DL
TRIIODOTHYRONINE, REVERSE: 24.8 NG/DL

## 2022-06-20 ENCOUNTER — PATIENT MESSAGE (OUTPATIENT)
Dept: INTEGRATIVE MEDICINE | Facility: CLINIC | Age: 47
End: 2022-06-20

## 2022-06-20 ENCOUNTER — TELEPHONE (OUTPATIENT)
Dept: INTEGRATIVE MEDICINE | Facility: CLINIC | Age: 47
End: 2022-06-20

## 2022-06-20 DIAGNOSIS — E78.2 MIXED HYPERLIPIDEMIA: ICD-10-CM

## 2022-06-20 NOTE — TELEPHONE ENCOUNTER
Patient stated he is experiencing mental health issues, requesting to speak with RN.  Also stated his test results seem abnormal. Thank you

## 2022-06-21 RX ORDER — CHOLESTYRAMINE 4 G/9G
4 POWDER, FOR SUSPENSION ORAL DAILY
Qty: 90 PACKET | Refills: 0 | Status: SHIPPED | OUTPATIENT
Start: 2022-06-21 | End: 2022-09-19

## 2022-06-21 RX ORDER — ROSUVASTATIN CALCIUM 5 MG/1
5 TABLET, COATED ORAL NIGHTLY
Qty: 90 TABLET | Refills: 0 | Status: SHIPPED | OUTPATIENT
Start: 2022-06-21 | End: 2022-09-19

## 2022-07-18 ENCOUNTER — PATIENT MESSAGE (OUTPATIENT)
Dept: INTEGRATIVE MEDICINE | Facility: CLINIC | Age: 47
End: 2022-07-18

## 2022-07-18 RX ORDER — TESTOSTERONE CYPIONATE 200 MG/ML
INJECTION INTRAMUSCULAR
Qty: 6 ML | Refills: 0 | Status: CANCELLED | OUTPATIENT
Start: 2022-07-18

## 2022-07-18 RX ORDER — TESTOSTERONE CYPIONATE 200 MG/ML
INJECTION INTRAMUSCULAR
Qty: 6 ML | Refills: 0 | Status: SHIPPED | OUTPATIENT
Start: 2022-07-18

## 2022-07-18 NOTE — TELEPHONE ENCOUNTER
From: Rigoberto Martins  To: Loree Salas DO  Sent: 7/18/2022 10:58 AM CDT  Subject: Testosterone refill    Hello,    Can you please send a refill for my testosterone medication. Please send to [facility] pharmacy. Also I need needles too.  Thank you

## 2022-07-27 RX ORDER — FLUOXETINE 10 MG/1
CAPSULE ORAL
Qty: 30 CAPSULE | Refills: 0 | Status: SHIPPED | OUTPATIENT
Start: 2022-07-27

## 2022-08-02 ENCOUNTER — OFFICE VISIT (OUTPATIENT)
Dept: INTEGRATIVE MEDICINE | Facility: CLINIC | Age: 47
End: 2022-08-02
Payer: COMMERCIAL

## 2022-08-02 VITALS
HEART RATE: 72 BPM | BODY MASS INDEX: 22.97 KG/M2 | HEIGHT: 72 IN | WEIGHT: 169.63 LBS | OXYGEN SATURATION: 98 % | DIASTOLIC BLOOD PRESSURE: 82 MMHG | SYSTOLIC BLOOD PRESSURE: 128 MMHG

## 2022-08-02 DIAGNOSIS — M54.41 ACUTE RIGHT-SIDED LOW BACK PAIN WITH RIGHT-SIDED SCIATICA: Primary | ICD-10-CM

## 2022-08-02 DIAGNOSIS — E34.8 ENDOCRINE AXIS DYSFUNCTION: ICD-10-CM

## 2022-08-02 DIAGNOSIS — E78.2 MIXED HYPERLIPIDEMIA: ICD-10-CM

## 2022-08-02 DIAGNOSIS — F43.0 STRESS REACTION: ICD-10-CM

## 2022-08-02 PROCEDURE — 3079F DIAST BP 80-89 MM HG: CPT | Performed by: FAMILY MEDICINE

## 2022-08-02 PROCEDURE — 99214 OFFICE O/P EST MOD 30 MIN: CPT | Performed by: FAMILY MEDICINE

## 2022-08-02 PROCEDURE — 3008F BODY MASS INDEX DOCD: CPT | Performed by: FAMILY MEDICINE

## 2022-08-02 PROCEDURE — 3074F SYST BP LT 130 MM HG: CPT | Performed by: FAMILY MEDICINE

## 2022-08-02 RX ORDER — CHOLESTYRAMINE 4 G/9G
1 POWDER, FOR SUSPENSION ORAL DAILY
COMMUNITY
Start: 2022-06-21

## 2022-08-02 RX ORDER — METHYLPREDNISOLONE 4 MG/1
TABLET ORAL
Qty: 21 TABLET | Refills: 0 | Status: SHIPPED | OUTPATIENT
Start: 2022-08-02

## 2022-08-21 NOTE — TELEPHONE ENCOUNTER
Contacted MD about HA. MD to bedside. Doppler 78/0. MD ordered to stop heparin gtt. STAT head CT ordered.    I spoke with pt, said he had a lot of labs done recently, is on HRT. Jim Shelton he has an appt in a couple months. Asking for Dr. Miguel Madrid to review results and advise if any abnormal results may be attributing to his symptoms. (lipids and IGF-CBP3 are elevated)    Said he is going through a hard time with family, stressing out \"really bad\". Having a hard time for the past few days. Pt mentioned a trauma but did not give me details. He is having panic attacks, hard to breath during episodes, not sleeping \"intensely stressing out\", crying spells, no energy. Symptoms are effecting his family life and work life. Jim Shelton he went through a really bad period 10 years ago, grief and stress reaction, ended up in the hospital, dx with Guillain-[place]. Feels like he is going down the same path. Denies SHIP. Questions re Prozac, has not taken it yet. Reluctant to do so. Thought Dr. Miguel Madrid said he was going to prescribe paxil 5 mg instead? Jim Shelton he was on a similar med in the past, got HA when he initially started med so he did not take it. Worried how prozac or paxil will affect him. Or if there is a best time to take med to potentially avoid side effects. Has a  at home. Currently seeing a therapist once a week. Same therapist he was seeing with spouse for couples counseling. I did ask if he can increase frequency of session. He said yes, sounds reluctant to do so as he has to pay $100 per session as therapist is not covered under plan. Other therapist in his network would cost $60 per session. He prefers to see someone he already knows and willing to pay a little extra. He will consider seeing therapist more often. I did empathize, I understand if he cannot pay that much extra. I am agreeable to ask Dr. Miguel Madrid to call him today if possible. I scheduled pt for virtual visit on  (I offered 7 am, appt is now gone - will ask  to call pt to coordinate appt). I also send relaxation techniques via [employer].

## 2022-08-24 RX ORDER — FLUOXETINE 10 MG/1
CAPSULE ORAL
Qty: 30 CAPSULE | Refills: 0 | Status: SHIPPED | OUTPATIENT
Start: 2022-08-24

## 2022-09-28 RX ORDER — FLUOXETINE 10 MG/1
CAPSULE ORAL
Qty: 30 CAPSULE | Refills: 2 | Status: SHIPPED | OUTPATIENT
Start: 2022-09-28

## 2022-10-21 ENCOUNTER — TELEMEDICINE (OUTPATIENT)
Dept: TELEHEALTH | Age: 47
End: 2022-10-21

## 2022-10-21 ENCOUNTER — TELEPHONE (OUTPATIENT)
Dept: INTEGRATIVE MEDICINE | Facility: CLINIC | Age: 47
End: 2022-10-21

## 2022-10-21 VITALS — HEIGHT: 72 IN | WEIGHT: 172 LBS | BODY MASS INDEX: 23.3 KG/M2

## 2022-10-21 DIAGNOSIS — U07.1 POSITIVE SELF-ADMINISTERED ANTIGEN TEST FOR COVID-19: Primary | ICD-10-CM

## 2022-10-21 PROCEDURE — 99213 OFFICE O/P EST LOW 20 MIN: CPT | Performed by: PHYSICIAN ASSISTANT

## 2022-10-21 RX ORDER — NIRMATRELVIR AND RITONAVIR 300-100 MG
KIT ORAL
Qty: 30 TABLET | Refills: 0 | Status: SHIPPED | OUTPATIENT
Start: 2022-10-21 | End: 2022-10-26

## 2022-10-21 RX ORDER — ROSUVASTATIN CALCIUM 5 MG/1
5 TABLET, COATED ORAL NIGHTLY
COMMUNITY

## 2022-10-21 RX ORDER — ALBUTEROL SULFATE 90 UG/1
2 AEROSOL, METERED RESPIRATORY (INHALATION) EVERY 6 HOURS PRN
Qty: 1 EACH | Refills: 0 | Status: SHIPPED | OUTPATIENT
Start: 2022-10-21 | End: 2022-11-20

## 2022-10-21 NOTE — TELEPHONE ENCOUNTER
Patient is calling in stating he has tested positive for COVID with an at home test. States he has a high fever, leg pain, and fatigue. Patient states he has been taking Ibermectin. Fever spiked to 102 , should he be concerned? Please call to advise.

## 2022-10-21 NOTE — TELEPHONE ENCOUNTER
Please call pt to tell him to go to urgent care, walk in clinic, or do e-visit now via [employer].

## 2022-10-24 ENCOUNTER — HOSPITAL ENCOUNTER (EMERGENCY)
Age: 47
Discharge: LEFT BEFORE DISCHARGE | End: 2022-10-25

## 2022-10-24 ENCOUNTER — TELEPHONE (OUTPATIENT)
Dept: INTEGRATIVE MEDICINE | Facility: CLINIC | Age: 47
End: 2022-10-24

## 2022-10-24 LAB
ALBUMIN SERPL-MCNC: 3.5 G/DL (ref 3.6–5.1)
ALBUMIN/GLOB SERPL: 1 {RATIO} (ref 1–2.4)
ALP SERPL-CCNC: 59 UNITS/L (ref 45–117)
ALT SERPL-CCNC: 49 UNITS/L
ANION GAP SERPL CALC-SCNC: 10 MMOL/L (ref 7–19)
AST SERPL-CCNC: 48 UNITS/L
BASOPHILS # BLD: 0 K/MCL (ref 0–0.3)
BASOPHILS NFR BLD: 0 %
BILIRUB SERPL-MCNC: 0.4 MG/DL (ref 0.2–1)
BUN SERPL-MCNC: 18 MG/DL (ref 6–20)
BUN/CREAT SERPL: 15 (ref 7–25)
CALCIUM SERPL-MCNC: 8.8 MG/DL (ref 8.4–10.2)
CHLORIDE SERPL-SCNC: 100 MMOL/L (ref 97–110)
CO2 SERPL-SCNC: 27 MMOL/L (ref 21–32)
CREAT SERPL-MCNC: 1.17 MG/DL (ref 0.67–1.17)
DEPRECATED RDW RBC: 39.2 FL (ref 39–50)
EOSINOPHIL # BLD: 0 K/MCL (ref 0–0.5)
EOSINOPHIL NFR BLD: 1 %
ERYTHROCYTE [DISTWIDTH] IN BLOOD: 12.5 % (ref 11–15)
FASTING DURATION TIME PATIENT: ABNORMAL H
FLUAV RNA RESP QL NAA+PROBE: NOT DETECTED
FLUBV RNA RESP QL NAA+PROBE: NOT DETECTED
GFR SERPLBLD BASED ON 1.73 SQ M-ARVRAT: 77 ML/MIN
GLOBULIN SER-MCNC: 3.6 G/DL (ref 2–4)
GLUCOSE SERPL-MCNC: 90 MG/DL (ref 70–99)
HCT VFR BLD CALC: 44.2 % (ref 39–51)
HGB BLD-MCNC: 15.3 G/DL (ref 13–17)
IMM GRANULOCYTES # BLD AUTO: 0 K/MCL (ref 0–0.2)
IMM GRANULOCYTES # BLD: 0 %
LYMPHOCYTES # BLD: 1.8 K/MCL (ref 1–4.8)
LYMPHOCYTES NFR BLD: 39 %
MAGNESIUM SERPL-MCNC: 2.4 MG/DL (ref 1.7–2.4)
MCH RBC QN AUTO: 29.8 PG (ref 26–34)
MCHC RBC AUTO-ENTMCNC: 34.6 G/DL (ref 32–36.5)
MCV RBC AUTO: 86 FL (ref 78–100)
MONOCYTES # BLD: 0.5 K/MCL (ref 0.3–0.9)
MONOCYTES NFR BLD: 11 %
NEUTROPHILS # BLD: 2.3 K/MCL (ref 1.8–7.7)
NEUTROPHILS NFR BLD: 49 %
NRBC BLD MANUAL-RTO: 0 /100 WBC
PLATELET # BLD AUTO: 225 K/MCL (ref 140–450)
POTASSIUM SERPL-SCNC: 4 MMOL/L (ref 3.4–5.1)
PROT SERPL-MCNC: 7.1 G/DL (ref 6.4–8.2)
RBC # BLD: 5.14 MIL/MCL (ref 4.5–5.9)
RSV AG NPH QL IA.RAPID: NOT DETECTED
SARS-COV-2 N GENE CT SPEC QN NAA N2: 18.6
SARS-COV-2 RNA RESP QL NAA+PROBE: DETECTED
SERVICE CMNT-IMP: ABNORMAL
SODIUM SERPL-SCNC: 133 MMOL/L (ref 135–145)
WBC # BLD: 4.6 K/MCL (ref 4.2–11)

## 2022-10-24 PROCEDURE — 36415 COLL VENOUS BLD VENIPUNCTURE: CPT

## 2022-10-24 PROCEDURE — C9803 HOPD COVID-19 SPEC COLLECT: HCPCS

## 2022-10-24 PROCEDURE — 0241U COVID/FLU/RSV PANEL: CPT | Performed by: EMERGENCY MEDICINE

## 2022-10-24 PROCEDURE — 85025 COMPLETE CBC W/AUTO DIFF WBC: CPT | Performed by: EMERGENCY MEDICINE

## 2022-10-24 PROCEDURE — 83735 ASSAY OF MAGNESIUM: CPT | Performed by: EMERGENCY MEDICINE

## 2022-10-24 PROCEDURE — 80053 COMPREHEN METABOLIC PANEL: CPT | Performed by: EMERGENCY MEDICINE

## 2022-10-24 PROCEDURE — 10003627 HB COUNTER ED NO SERVICE

## 2022-10-24 ASSESSMENT — PAIN DESCRIPTION - PAIN TYPE: TYPE: ACUTE PAIN

## 2022-10-24 ASSESSMENT — PAIN SCALES - GENERAL: PAINLEVEL_OUTOF10: 4

## 2022-10-24 NOTE — TELEPHONE ENCOUNTER
BLANCO Hoffman#[phone]    Received voicemail, requesting call back regarding Paxlovid and drug interactions.     Thank you

## 2022-10-25 VITALS
SYSTOLIC BLOOD PRESSURE: 129 MMHG | HEART RATE: 59 BPM | DIASTOLIC BLOOD PRESSURE: 86 MMHG | OXYGEN SATURATION: 98 % | RESPIRATION RATE: 19 BRPM | TEMPERATURE: 98.2 F

## 2022-11-08 ENCOUNTER — OFFICE VISIT (OUTPATIENT)
Dept: INTEGRATIVE MEDICINE | Facility: CLINIC | Age: 47
End: 2022-11-08
Payer: COMMERCIAL

## 2022-11-08 VITALS
SYSTOLIC BLOOD PRESSURE: 118 MMHG | HEART RATE: 67 BPM | DIASTOLIC BLOOD PRESSURE: 78 MMHG | OXYGEN SATURATION: 98 % | WEIGHT: 170.81 LBS | BODY MASS INDEX: 23 KG/M2

## 2022-11-08 DIAGNOSIS — R53.82 CHRONIC FATIGUE: ICD-10-CM

## 2022-11-08 DIAGNOSIS — E78.5 HYPERLIPIDEMIA, UNSPECIFIED HYPERLIPIDEMIA TYPE: ICD-10-CM

## 2022-11-08 DIAGNOSIS — E78.2 MIXED HYPERLIPIDEMIA: Primary | ICD-10-CM

## 2022-11-08 DIAGNOSIS — R79.89 ELEVATED HOMOCYSTEINE: ICD-10-CM

## 2022-11-08 DIAGNOSIS — F41.9 ANXIETY: ICD-10-CM

## 2022-11-08 DIAGNOSIS — E34.8 ENDOCRINE AXIS DYSFUNCTION: ICD-10-CM

## 2022-11-08 DIAGNOSIS — R71.8 ELEVATED HEMATOCRIT: ICD-10-CM

## 2022-11-08 DIAGNOSIS — F43.0 STRESS REACTION: ICD-10-CM

## 2022-11-08 PROCEDURE — 99214 OFFICE O/P EST MOD 30 MIN: CPT | Performed by: FAMILY MEDICINE

## 2022-11-08 PROCEDURE — 3074F SYST BP LT 130 MM HG: CPT | Performed by: FAMILY MEDICINE

## 2022-11-08 PROCEDURE — 3078F DIAST BP <80 MM HG: CPT | Performed by: FAMILY MEDICINE

## 2022-11-08 RX ORDER — VALSARTAN AND HYDROCHLOROTHIAZIDE 320; 12.5 MG/1; MG/1
25 TABLET, FILM COATED ORAL NIGHTLY
Qty: 30 TABLET | Refills: 0 | Status: SHIPPED | OUTPATIENT
Start: 2022-11-08

## 2022-11-08 RX ORDER — TESTOSTERONE CYPIONATE 200 MG/ML
INJECTION INTRAMUSCULAR
Qty: 6 ML | Refills: 0 | OUTPATIENT
Start: 2022-11-08

## 2022-12-23 RX ORDER — ALPRAZOLAM 0.5 MG/1
TABLET, ORALLY DISINTEGRATING ORAL
Qty: 30 TABLET | Refills: 0 | Status: SHIPPED | OUTPATIENT
Start: 2022-12-23

## 2022-12-27 ENCOUNTER — TELEPHONE (OUTPATIENT)
Dept: INTEGRATIVE MEDICINE | Facility: CLINIC | Age: 47
End: 2022-12-27

## 2022-12-27 DIAGNOSIS — M54.41 ACUTE RIGHT-SIDED LOW BACK PAIN WITH RIGHT-SIDED SCIATICA: Primary | ICD-10-CM

## 2022-12-27 NOTE — TELEPHONE ENCOUNTER
Patient requesting new referral for PT and acupuncture (only issued for 2 months is now ).      Thank you

## 2023-01-06 RX ORDER — TESTOSTERONE CYPIONATE 200 MG/ML
INJECTION INTRAMUSCULAR
Qty: 6 ML | Refills: 0 | Status: SHIPPED | OUTPATIENT
Start: 2023-01-06

## 2023-02-14 ENCOUNTER — PATIENT MESSAGE (OUTPATIENT)
Dept: INTEGRATIVE MEDICINE | Facility: CLINIC | Age: 48
End: 2023-02-14

## 2023-02-14 DIAGNOSIS — R79.89 ELEVATED HOMOCYSTEINE: ICD-10-CM

## 2023-02-14 DIAGNOSIS — E78.5 HYPERLIPIDEMIA, UNSPECIFIED HYPERLIPIDEMIA TYPE: Primary | ICD-10-CM

## 2023-02-14 DIAGNOSIS — E34.8 ENDOCRINE AXIS DYSFUNCTION: ICD-10-CM

## 2023-02-14 DIAGNOSIS — R74.8 ELEVATED LIVER ENZYMES: ICD-10-CM

## 2023-02-14 DIAGNOSIS — R71.8 ELEVATED HEMATOCRIT: ICD-10-CM

## 2023-02-14 DIAGNOSIS — R53.82 CHRONIC FATIGUE: ICD-10-CM

## 2023-02-16 ENCOUNTER — OFFICE VISIT (OUTPATIENT)
Dept: GASTROENTEROLOGY | Facility: CLINIC | Age: 48
End: 2023-02-16

## 2023-02-16 ENCOUNTER — LAB ENCOUNTER (OUTPATIENT)
Dept: LAB | Age: 48
End: 2023-02-16
Attending: NURSE PRACTITIONER
Payer: COMMERCIAL

## 2023-02-16 VITALS
DIASTOLIC BLOOD PRESSURE: 72 MMHG | WEIGHT: 180 LBS | SYSTOLIC BLOOD PRESSURE: 114 MMHG | BODY MASS INDEX: 24.38 KG/M2 | HEIGHT: 72 IN | HEART RATE: 71 BPM

## 2023-02-16 DIAGNOSIS — K59.00 CONSTIPATION, UNSPECIFIED CONSTIPATION TYPE: ICD-10-CM

## 2023-02-16 DIAGNOSIS — K76.9 HEPATIC LESION: ICD-10-CM

## 2023-02-16 DIAGNOSIS — R16.0 HEPATOMEGALY: Primary | ICD-10-CM

## 2023-02-16 DIAGNOSIS — K64.9 HEMORRHOIDS, UNSPECIFIED HEMORRHOID TYPE: ICD-10-CM

## 2023-02-16 DIAGNOSIS — K76.0 HEPATIC STEATOSIS: ICD-10-CM

## 2023-02-16 PROCEDURE — 99244 OFF/OP CNSLTJ NEW/EST MOD 40: CPT | Performed by: NURSE PRACTITIONER

## 2023-02-16 PROCEDURE — 3074F SYST BP LT 130 MM HG: CPT | Performed by: NURSE PRACTITIONER

## 2023-02-16 PROCEDURE — 3078F DIAST BP <80 MM HG: CPT | Performed by: NURSE PRACTITIONER

## 2023-02-16 PROCEDURE — 3008F BODY MASS INDEX DOCD: CPT | Performed by: NURSE PRACTITIONER

## 2023-02-16 RX ORDER — POLYETHYLENE GLYCOL 3350, SODIUM CHLORIDE, SODIUM BICARBONATE, POTASSIUM CHLORIDE 420; 11.2; 5.72; 1.48 G/4L; G/4L; G/4L; G/4L
POWDER, FOR SOLUTION ORAL
Qty: 4000 ML | Refills: 0 | Status: SHIPPED | OUTPATIENT
Start: 2023-02-16

## 2023-02-16 NOTE — PATIENT INSTRUCTIONS
-low-fat diet  -good cholesterol and hgba1c control with pcp  -repeat lab  -get mri abdomen  -continue fiber      1. Schedule colonoscopy with MAC w/ Dr. Nena Kwan or Dr Chavez Confer  #crc screening  #constipation  #hemorrhoids: ]    2.  bowel prep from pharmacy (split trilyte)    3. Continue all medications for procedure    4. Read all bowel prep instructions carefully    5. AVOID seeds, nuts, popcorn, raw fruits and vegetables (cooked is okay) for 2-3 days before procedure    6. You will need to be tested for COVID within 72 hours of your procedure. You will be contacted with instructions on how to do this.       >>>Please note: if you were prescribed Suprep for the bowel prep and it is too expensive or not covered by insurance, it is okay to substitute Trilyte (or any similar generic prep). This can be done by notifying the pharmacy or calling our office.

## 2023-02-17 ENCOUNTER — TELEPHONE (OUTPATIENT)
Facility: CLINIC | Age: 48
End: 2023-02-17

## 2023-02-17 DIAGNOSIS — K64.9 HEMORRHOIDS WITHOUT COMPLICATION: ICD-10-CM

## 2023-02-17 DIAGNOSIS — Z12.11 SCREENING FOR COLON CANCER: Primary | ICD-10-CM

## 2023-02-17 DIAGNOSIS — K59.00 CONSTIPATION, UNSPECIFIED CONSTIPATION TYPE: ICD-10-CM

## 2023-02-17 LAB
ABSOLUTE BASOPHILS: 49 CELLS/UL (ref 0–200)
ABSOLUTE EOSINOPHILS: 91 CELLS/UL (ref 15–500)
ABSOLUTE LYMPHOCYTES: 2513 CELLS/UL (ref 850–3900)
ABSOLUTE MONOCYTES: 609 CELLS/UL (ref 200–950)
ABSOLUTE NEUTROPHILS: 3738 CELLS/UL (ref 1500–7800)
ALBUMIN/GLOBULIN RATIO: 1.8 (CALC) (ref 1–2.5)
ALBUMIN: 4.2 G/DL (ref 3.6–5.1)
ALKALINE PHOSPHATASE: 58 U/L (ref 36–130)
ALT: 29 U/L (ref 9–46)
AST: 25 U/L (ref 10–40)
BASOPHILS: 0.7 %
BILIRUBIN, TOTAL: 0.3 MG/DL (ref 0.2–1.2)
BUN: 20 MG/DL (ref 7–25)
CALCIUM: 9.4 MG/DL (ref 8.6–10.3)
CARBON DIOXIDE: 27 MMOL/L (ref 20–32)
CHLORIDE: 104 MMOL/L (ref 98–110)
CREATININE: 0.98 MG/DL (ref 0.6–1.29)
EGFR: 95 ML/MIN/1.73M2
EOSINOPHILS: 1.3 %
GLOBULIN: 2.3 G/DL (CALC) (ref 1.9–3.7)
GLUCOSE: 80 MG/DL (ref 65–99)
HEMATOCRIT: 44.5 % (ref 38.5–50)
HEMOGLOBIN: 15.4 G/DL (ref 13.2–17.1)
LYMPHOCYTES: 35.9 %
MCH: 29.6 PG (ref 27–33)
MCHC: 34.6 G/DL (ref 32–36)
MCV: 85.4 FL (ref 80–100)
MONOCYTES: 8.7 %
MPV: 11.7 FL (ref 7.5–12.5)
NEUTROPHILS: 53.4 %
PLATELET COUNT: 306 THOUSAND/UL (ref 140–400)
POTASSIUM: 4.6 MMOL/L (ref 3.5–5.3)
PROTEIN, TOTAL: 6.5 G/DL (ref 6.1–8.1)
RDW: 12.2 % (ref 11–15)
RED BLOOD CELL COUNT: 5.21 MILLION/UL (ref 4.2–5.8)
SODIUM: 138 MMOL/L (ref 135–146)
WHITE BLOOD CELL COUNT: 7 THOUSAND/UL (ref 3.8–10.8)

## 2023-02-17 NOTE — TELEPHONE ENCOUNTER
Scheduled for:  Colonoscopy [address]9720  Provider Name:  Carlos Alberto Evans  Date:  2/22/23  Location:  [facility]  Sedation:  MAC  Time:  9:15 am, (pt is aware to arrive at 8:15 am)  Prep: Trilyte   Meds/Allergies Reconciled?: Monica/NP reviewed. Diagnosis with codes: Screening for Colon Cancer Z12.11, Constipation K59.00, Hemorrhoids K64.9    Was patient informed to call insurance with codes (Y/N):  Yes  Referral sent?:  Referral was sent at the time of electronic surgical scheduling. [facility] or [id number]  notified?:   I sent an electronic request to Endo Scheduling and received a confirmation today. Medication Orders:  N/A  Misc Orders: N/A      Further instructions given by staff: I discussed the prep instructions with the patient which he verbally understood. Provided patient with prep instructions at the time of office visit.

## 2023-02-20 ENCOUNTER — TELEPHONE (OUTPATIENT)
Dept: SURGERY | Age: 48
End: 2023-02-20

## 2023-02-20 NOTE — TELEPHONE ENCOUNTER
noTed hx of constipation, left VM for patient to take dulcolax x1 today and tomorrow AM before prepping tomorrow night for c-scope.

## 2023-02-22 ENCOUNTER — ANESTHESIA (OUTPATIENT)
Dept: ENDOSCOPY | Age: 48
End: 2023-02-22
Payer: COMMERCIAL

## 2023-02-22 ENCOUNTER — ANESTHESIA EVENT (OUTPATIENT)
Dept: ENDOSCOPY | Age: 48
End: 2023-02-22
Payer: COMMERCIAL

## 2023-02-22 ENCOUNTER — HOSPITAL ENCOUNTER (OUTPATIENT)
Age: 48
Setting detail: HOSPITAL OUTPATIENT SURGERY
Discharge: HOME | End: 2023-02-22
Attending: INTERNAL MEDICINE | Admitting: INTERNAL MEDICINE
Payer: COMMERCIAL

## 2023-02-22 VITALS
OXYGEN SATURATION: 99 % | HEIGHT: 72 IN | WEIGHT: 180 LBS | HEART RATE: 60 BPM | BODY MASS INDEX: 24.38 KG/M2 | DIASTOLIC BLOOD PRESSURE: 87 MMHG | SYSTOLIC BLOOD PRESSURE: 139 MMHG | RESPIRATION RATE: 10 BRPM

## 2023-02-22 DIAGNOSIS — Z12.11 SCREENING FOR COLON CANCER: ICD-10-CM

## 2023-02-22 DIAGNOSIS — K59.00 CONSTIPATION, UNSPECIFIED CONSTIPATION TYPE: ICD-10-CM

## 2023-02-22 DIAGNOSIS — K64.9 HEMORRHOIDS WITHOUT COMPLICATION: ICD-10-CM

## 2023-02-22 PROCEDURE — 45385 COLONOSCOPY W/LESION REMOVAL: CPT | Performed by: INTERNAL MEDICINE

## 2023-02-22 PROCEDURE — 99070 SPECIAL SUPPLIES PHYS/QHP: CPT | Performed by: INTERNAL MEDICINE

## 2023-02-22 RX ORDER — SODIUM CHLORIDE, SODIUM LACTATE, POTASSIUM CHLORIDE, CALCIUM CHLORIDE 600; 310; 30; 20 MG/100ML; MG/100ML; MG/100ML; MG/100ML
INJECTION, SOLUTION INTRAVENOUS CONTINUOUS
Status: DISCONTINUED | OUTPATIENT
Start: 2023-02-22 | End: 2023-02-22

## 2023-02-22 RX ORDER — LIDOCAINE HYDROCHLORIDE 10 MG/ML
INJECTION, SOLUTION EPIDURAL; INFILTRATION; INTRACAUDAL; PERINEURAL AS NEEDED
Status: DISCONTINUED | OUTPATIENT
Start: 2023-02-22 | End: 2023-02-22 | Stop reason: SURG

## 2023-02-22 RX ADMIN — LIDOCAINE HYDROCHLORIDE 25 MG: 10 INJECTION, SOLUTION EPIDURAL; INFILTRATION; INTRACAUDAL; PERINEURAL at 08:53:00

## 2023-02-22 RX ADMIN — SODIUM CHLORIDE, SODIUM LACTATE, POTASSIUM CHLORIDE, CALCIUM CHLORIDE: 600; 310; 30; 20 INJECTION, SOLUTION INTRAVENOUS at 09:12:00

## 2023-02-22 RX ADMIN — SODIUM CHLORIDE, SODIUM LACTATE, POTASSIUM CHLORIDE, CALCIUM CHLORIDE: 600; 310; 30; 20 INJECTION, SOLUTION INTRAVENOUS at 08:52:00

## 2023-02-22 NOTE — DISCHARGE INSTRUCTIONS

## 2023-02-22 NOTE — ANESTHESIA POSTPROCEDURE EVALUATION
Patient: Emperatriz Jarvis    Procedure Summary     Date: 02/22/23 Room / Location: [facility] ENDOSCOPY 02 / [facility] ENDO    Anesthesia Start: 0825 Anesthesia Stop: 8428    Procedure: COLONOSCOPY Diagnosis:       Screening for colon cancer      Constipation, unspecified constipation type      Hemorrhoids without complication      (polyp, hemorrhoids)    Surgeons: Jenelle Cruz MD Anesthesiologist:     Anesthesia Type: MAC ASA Status: 3          Anesthesia Type: MAC    Vitals Value Taken Time   /87 02/22/23 0920   Temp 98 02/22/23 0920   Pulse 65 02/22/23 0920   Resp 17 02/22/23 0920   SpO2 98 02/22/23 0920   Vitals shown include unvalidated device data.     [facility] Evaluation:   Patient Evaluated in PACU  Patient Participation: complete - patient participated  Level of Consciousness: awake  Pain Management: adequate  Airway Patency:patent  Dental exam unchanged from preop  Yes    Cardiovascular Status: acceptable  Respiratory Status: acceptable  Postoperative Hydration acceptable      Maryan Pinon MD  2/22/2023 9:20 AM

## 2023-03-09 ENCOUNTER — TELEPHONE (OUTPATIENT)
Dept: GASTROENTEROLOGY | Facility: CLINIC | Age: 48
End: 2023-03-09

## 2023-03-09 NOTE — TELEPHONE ENCOUNTER
I mailed out colonoscopy results letter to pt  Updated health maintenance  Entered into 7 yr recall  Recall colon in 7 years per. Colon done 2/22/2023    Dequincy MD Sosa  P Em Gi Clinical Staff  GI staff: please place recall for colonoscopy in 7 years.

## 2023-03-15 ENCOUNTER — HOSPITAL ENCOUNTER (OUTPATIENT)
Dept: MRI IMAGING | Facility: HOSPITAL | Age: 48
Discharge: HOME | End: 2023-03-15
Attending: NURSE PRACTITIONER
Payer: COMMERCIAL

## 2023-03-15 DIAGNOSIS — K76.0 HEPATIC STEATOSIS: ICD-10-CM

## 2023-03-15 DIAGNOSIS — R16.0 HEPATOMEGALY: ICD-10-CM

## 2023-03-15 DIAGNOSIS — K76.9 HEPATIC LESION: ICD-10-CM

## 2023-03-15 PROCEDURE — A9575 INJ GADOTERATE MEGLUMI 0.1ML: HCPCS | Performed by: NURSE PRACTITIONER

## 2023-03-15 PROCEDURE — 74183 MRI ABD W/O CNTR FLWD CNTR: CPT | Performed by: NURSE PRACTITIONER

## 2023-03-15 RX ORDER — GADOTERATE MEGLUMINE 376.9 MG/ML
20 INJECTION INTRAVENOUS
Status: COMPLETED | OUTPATIENT
Start: 2023-03-15 | End: 2023-03-15

## 2023-03-15 RX ADMIN — GADOTERATE MEGLUMINE 16 ML: 376.9 INJECTION INTRAVENOUS at 18:46:00

## 2023-03-22 ENCOUNTER — LAB ENCOUNTER (OUTPATIENT)
Dept: LAB | Age: 48
End: 2023-03-22
Attending: FAMILY MEDICINE
Payer: COMMERCIAL

## 2023-03-22 DIAGNOSIS — R71.8 ELEVATED HEMATOCRIT: ICD-10-CM

## 2023-03-22 DIAGNOSIS — E34.8 ENDOCRINE AXIS DYSFUNCTION: ICD-10-CM

## 2023-03-22 DIAGNOSIS — R74.8 ELEVATED LIVER ENZYMES: ICD-10-CM

## 2023-03-22 DIAGNOSIS — E78.5 HYPERLIPIDEMIA, UNSPECIFIED HYPERLIPIDEMIA TYPE: ICD-10-CM

## 2023-03-22 DIAGNOSIS — R53.82 CHRONIC FATIGUE: ICD-10-CM

## 2023-03-22 DIAGNOSIS — R79.89 ELEVATED HOMOCYSTEINE: ICD-10-CM

## 2023-03-22 LAB
ALBUMIN SERPL-MCNC: 3.9 G/DL (ref 3.4–5)
ALBUMIN/GLOB SERPL: 1.1 {RATIO} (ref 1–2)
ALP LIVER SERPL-CCNC: 74 U/L
ALT SERPL-CCNC: 27 U/L
ANION GAP SERPL CALC-SCNC: 7 MMOL/L (ref 0–18)
AST SERPL-CCNC: 22 U/L (ref 15–37)
BASOPHILS # BLD AUTO: 0.06 X10(3) UL (ref 0–0.2)
BASOPHILS NFR BLD AUTO: 1 %
BILIRUB SERPL-MCNC: 1.1 MG/DL (ref 0.1–2)
BUN BLD-MCNC: 12 MG/DL (ref 7–18)
BUN/CREAT SERPL: 10 (ref 10–20)
CALCIUM BLD-MCNC: 9.5 MG/DL (ref 8.5–10.1)
CHLORIDE SERPL-SCNC: 106 MMOL/L (ref 98–112)
CHOLEST SERPL-MCNC: 200 MG/DL (ref ?–200)
CO2 SERPL-SCNC: 29 MMOL/L (ref 21–32)
COMPLEXED PSA SERPL-MCNC: 1.18 NG/ML (ref ?–4)
CREAT BLD-MCNC: 1.2 MG/DL
CRP SERPL HS-MCNC: 0.64 MG/L (ref ?–3)
DEPRECATED RDW RBC AUTO: 37.3 FL (ref 35.1–46.3)
DHEA-S SERPL-MCNC: 121.1 UG/DL
EOSINOPHIL # BLD AUTO: 0.09 X10(3) UL (ref 0–0.7)
EOSINOPHIL NFR BLD AUTO: 1.5 %
ERYTHROCYTE [DISTWIDTH] IN BLOOD BY AUTOMATED COUNT: 12 % (ref 11–15)
ESTRADIOL SERPL-MCNC: 35.6 PG/ML
FASTING PATIENT LIPID ANSWER: YES
FASTING STATUS PATIENT QL REPORTED: YES
FSH SERPL-ACNC: <0.2 MIU/ML
GFR SERPLBLD BASED ON 1.73 SQ M-ARVRAT: 75 ML/MIN/1.73M2 (ref 60–?)
GLOBULIN PLAS-MCNC: 3.5 G/DL (ref 2.8–4.4)
GLUCOSE BLD-MCNC: 109 MG/DL (ref 70–99)
HCT VFR BLD AUTO: 49.9 %
HCYS SERPL-SCNC: 8.7 UMOL/L (ref 3.2–10.7)
HDLC SERPL-MCNC: 52 MG/DL (ref 40–59)
HGB BLD-MCNC: 16.9 G/DL
IMM GRANULOCYTES # BLD AUTO: 0.02 X10(3) UL (ref 0–1)
IMM GRANULOCYTES NFR BLD: 0.3 %
LDLC SERPL CALC-MCNC: 134 MG/DL (ref ?–100)
LH SERPL-ACNC: <0.2 MIU/ML
LYMPHOCYTES # BLD AUTO: 1.73 X10(3) UL (ref 1–4)
LYMPHOCYTES NFR BLD AUTO: 29.1 %
MCH RBC QN AUTO: 28.9 PG (ref 26–34)
MCHC RBC AUTO-ENTMCNC: 33.9 G/DL (ref 31–37)
MCV RBC AUTO: 85.4 FL
MONOCYTES # BLD AUTO: 0.52 X10(3) UL (ref 0.1–1)
MONOCYTES NFR BLD AUTO: 8.7 %
NEUTROPHILS # BLD AUTO: 3.53 X10 (3) UL (ref 1.5–7.7)
NEUTROPHILS # BLD AUTO: 3.53 X10(3) UL (ref 1.5–7.7)
NEUTROPHILS NFR BLD AUTO: 59.4 %
NONHDLC SERPL-MCNC: 148 MG/DL (ref ?–130)
OSMOLALITY SERPL CALC.SUM OF ELEC: 294 MOSM/KG (ref 275–295)
PLATELET # BLD AUTO: 317 10(3)UL (ref 150–450)
POTASSIUM SERPL-SCNC: 4 MMOL/L (ref 3.5–5.1)
PROLACTIN SERPL-MCNC: 5.9 NG/ML
PROT SERPL-MCNC: 7.4 G/DL (ref 6.4–8.2)
RBC # BLD AUTO: 5.84 X10(6)UL
SODIUM SERPL-SCNC: 142 MMOL/L (ref 136–145)
T3FREE SERPL-MCNC: 2.89 PG/ML (ref 2.4–4.2)
T4 FREE SERPL-MCNC: 1.3 NG/DL (ref 0.8–1.7)
TRIGL SERPL-MCNC: 75 MG/DL (ref 30–149)
TSI SER-ACNC: 1.3 MIU/ML (ref 0.36–3.74)
VLDLC SERPL CALC-MCNC: 14 MG/DL (ref 0–30)
WBC # BLD AUTO: 6 X10(3) UL (ref 4–11)

## 2023-03-22 PROCEDURE — 86141 C-REACTIVE PROTEIN HS: CPT | Performed by: FAMILY MEDICINE

## 2023-03-22 PROCEDURE — 84481 FREE ASSAY (FT-3): CPT | Performed by: FAMILY MEDICINE

## 2023-03-22 PROCEDURE — 84403 ASSAY OF TOTAL TESTOSTERONE: CPT | Performed by: FAMILY MEDICINE

## 2023-03-22 PROCEDURE — 80053 COMPREHEN METABOLIC PANEL: CPT | Performed by: FAMILY MEDICINE

## 2023-03-22 PROCEDURE — 84482 T3 REVERSE: CPT | Performed by: FAMILY MEDICINE

## 2023-03-22 PROCEDURE — 84439 ASSAY OF FREE THYROXINE: CPT | Performed by: FAMILY MEDICINE

## 2023-03-22 PROCEDURE — 84443 ASSAY THYROID STIM HORMONE: CPT | Performed by: FAMILY MEDICINE

## 2023-03-22 PROCEDURE — 85025 COMPLETE CBC W/AUTO DIFF WBC: CPT | Performed by: FAMILY MEDICINE

## 2023-03-22 PROCEDURE — 84305 ASSAY OF SOMATOMEDIN: CPT | Performed by: FAMILY MEDICINE

## 2023-03-22 PROCEDURE — 80061 LIPID PANEL: CPT | Performed by: FAMILY MEDICINE

## 2023-03-22 PROCEDURE — 83090 ASSAY OF HOMOCYSTEINE: CPT | Performed by: FAMILY MEDICINE

## 2023-03-22 PROCEDURE — 83001 ASSAY OF GONADOTROPIN (FSH): CPT | Performed by: FAMILY MEDICINE

## 2023-03-22 PROCEDURE — 82627 DEHYDROEPIANDROSTERONE: CPT | Performed by: FAMILY MEDICINE

## 2023-03-22 PROCEDURE — 84146 ASSAY OF PROLACTIN: CPT | Performed by: FAMILY MEDICINE

## 2023-03-22 PROCEDURE — 82670 ASSAY OF TOTAL ESTRADIOL: CPT | Performed by: FAMILY MEDICINE

## 2023-03-22 PROCEDURE — 83002 ASSAY OF GONADOTROPIN (LH): CPT | Performed by: FAMILY MEDICINE

## 2023-03-22 PROCEDURE — 36415 COLL VENOUS BLD VENIPUNCTURE: CPT | Performed by: FAMILY MEDICINE

## 2023-03-22 PROCEDURE — 84402 ASSAY OF FREE TESTOSTERONE: CPT | Performed by: FAMILY MEDICINE

## 2023-03-23 ENCOUNTER — HOSPITAL ENCOUNTER (OUTPATIENT)
Age: 48
Discharge: HOME | End: 2023-03-23
Payer: COMMERCIAL

## 2023-03-23 VITALS
SYSTOLIC BLOOD PRESSURE: 150 MMHG | RESPIRATION RATE: 17 BRPM | HEART RATE: 73 BPM | OXYGEN SATURATION: 99 % | DIASTOLIC BLOOD PRESSURE: 89 MMHG | TEMPERATURE: 98 F

## 2023-03-23 DIAGNOSIS — H01.02B SQUAMOUS BLEPHARITIS OF BOTH UPPER AND LOWER EYELID OF LEFT EYE: Primary | ICD-10-CM

## 2023-03-23 LAB
IGF 1 Z SCORE CALCULATION: 2.4
IGF-1 (INSULINE-LIKE GROWTH FACTOR 1): 264 NG/ML

## 2023-03-23 PROCEDURE — 99213 OFFICE O/P EST LOW 20 MIN: CPT | Performed by: NURSE PRACTITIONER

## 2023-03-23 RX ORDER — ERYTHROMYCIN 5 MG/G
1 OINTMENT OPHTHALMIC EVERY 6 HOURS
Qty: 1 G | Refills: 0 | Status: SHIPPED | OUTPATIENT
Start: 2023-03-23 | End: 2023-03-30

## 2023-03-23 RX ORDER — SULFAMETHOXAZOLE AND TRIMETHOPRIM 800; 160 MG/1; MG/1
1 TABLET ORAL 2 TIMES DAILY
Qty: 14 TABLET | Refills: 0 | Status: SHIPPED | OUTPATIENT
Start: 2023-03-23 | End: 2023-03-30

## 2023-03-23 NOTE — ED INITIAL ASSESSMENT (HPI)
Pt came in due to left eye redness, irritation and left upper eyelid swelling for the past 2 days. Pt has easy non labored respirations.

## 2023-03-25 LAB — TRIIODOTHYRONINE, REVERSE: 30.6 NG/DL

## 2023-03-26 LAB
TESTOSTERONE, FREE, S: 26.2 NG/DL
TESTOSTERONE, TOTAL, S: 748 NG/DL

## 2023-03-27 ENCOUNTER — TELEMEDICINE (OUTPATIENT)
Dept: INTEGRATIVE MEDICINE | Facility: CLINIC | Age: 48
End: 2023-03-27
Payer: COMMERCIAL

## 2023-03-27 DIAGNOSIS — K64.9 HEMORRHOIDS, UNSPECIFIED HEMORRHOID TYPE: ICD-10-CM

## 2023-03-27 DIAGNOSIS — R73.01 ELEVATED FASTING BLOOD SUGAR: ICD-10-CM

## 2023-03-27 DIAGNOSIS — E78.5 HYPERLIPIDEMIA, UNSPECIFIED HYPERLIPIDEMIA TYPE: ICD-10-CM

## 2023-03-27 DIAGNOSIS — H00.011 HORDEOLUM EXTERNUM OF RIGHT UPPER EYELID: Primary | ICD-10-CM

## 2023-03-27 RX ORDER — ALPRAZOLAM 0.5 MG/1
0.5 TABLET, ORALLY DISINTEGRATING ORAL NIGHTLY PRN
Qty: 30 TABLET | Refills: 0 | Status: SHIPPED | OUTPATIENT
Start: 2023-03-27

## 2023-03-30 ENCOUNTER — PATIENT MESSAGE (OUTPATIENT)
Dept: GASTROENTEROLOGY | Facility: CLINIC | Age: 48
End: 2023-03-30

## 2023-03-30 NOTE — TELEPHONE ENCOUNTER
From: Jocelynn Kimlbe  To: Ilsa Fong. Tarsha Funez  Sent: 3/30/2023 4:56 PM CDT  Subject: Liver MRI Results    Chas Anderson,    I hope you are doing well. So the liver MRI test results cam back that you ordered for me. I was wondering if you had some time to discuss them. Do I need to make an appointment with you?

## 2023-04-04 DIAGNOSIS — R73.01 ELEVATED FASTING BLOOD SUGAR: Primary | ICD-10-CM

## 2023-04-12 RX ORDER — TESTOSTERONE CYPIONATE 200 MG/ML
INJECTION, SOLUTION INTRAMUSCULAR
Qty: 6 ML | Refills: 0 | Status: SHIPPED | OUTPATIENT
Start: 2023-04-12

## 2023-04-21 ENCOUNTER — TELEPHONE (OUTPATIENT)
Dept: INTEGRATIVE MEDICINE | Facility: CLINIC | Age: 48
End: 2023-04-21

## 2023-04-21 DIAGNOSIS — K64.9 HEMORRHOIDS, UNSPECIFIED HEMORRHOID TYPE: Primary | ICD-10-CM

## 2023-04-21 NOTE — TELEPHONE ENCOUNTER
Per Sondra Kent message, patient requesting new referral to surgery with different provider as referred to provider OON. New referral pended.

## 2023-04-24 RX ORDER — BUPROPION HYDROCHLORIDE 150 MG/1
150 TABLET ORAL DAILY
Qty: 30 TABLET | Refills: 0 | Status: SHIPPED | OUTPATIENT
Start: 2023-04-24

## 2023-04-24 RX ORDER — BUPROPION HYDROCHLORIDE 150 MG/1
TABLET ORAL
Qty: 30 TABLET | Refills: 0 | OUTPATIENT
Start: 2023-04-24

## 2023-04-24 NOTE — TELEPHONE ENCOUNTER
A refill request was received for:  Requested Prescriptions     Pending Prescriptions Disp Refills    BUPROPION  MG Oral Tablet 24 Hr [Pharmacy Med Name: Bupropion Hydrochloride Er (Xl) 24hr 150 Mg Tab Acco] 30 tablet 0     Sig: TAKE ONE TABLET BY MOUTH ONE TIME DAILY     Last refill date:  03/30/2023  Qty: 30  Last office visit: 03/27/2023  When is follow up due: 06/27/2023    Future Appointments   Date Time Provider Elsie Colmenares   7/26/2023  1:00 PM Dolly Chopra [facility] Greg Nunez   8/3/2023  1:30 PM Ten Acosta MD [address] [place] [address]

## 2023-05-09 ENCOUNTER — PATIENT MESSAGE (OUTPATIENT)
Dept: INTEGRATIVE MEDICINE | Facility: CLINIC | Age: 48
End: 2023-05-09

## 2023-05-09 DIAGNOSIS — R73.01 ELEVATED FASTING BLOOD SUGAR: Primary | ICD-10-CM

## 2023-05-10 RX ORDER — BUPROPION HYDROCHLORIDE 300 MG/1
300 TABLET ORAL DAILY
Qty: 30 TABLET | Refills: 0 | Status: SHIPPED | OUTPATIENT
Start: 2023-05-10

## 2023-05-10 NOTE — TELEPHONE ENCOUNTER
From: Kerrie Stroud  To: Cat Martinez DO  Sent: 5/9/2023 2:20 PM CDT  Subject: Medication    Hi Dr. Cm López,    Just wanted to let you know I have been taking the Wellbutrin for a few months and I feel its going well. I feel slightly more energetic and focused and better. With that being said I am still having some anxiety issues especially with recent new stressors in my life at home. I am wondering if we should increase the Wellbutrin dose as well as add Xanax here and there as needed. I also wanted to ask your opinion about using cannabis while on Wellbutrin. I read that cannabis may have an interaction. I also read that xanax in exxessive use may have interactions. I also read caffiene can have interactions in high use. I barely touch my xanax that you prescribed me but I think I need it now more as i am going through some hard times, but I will wait for your feedback.      Thanks

## 2023-05-10 NOTE — TELEPHONE ENCOUNTER
RN s/w patient. Patient states that he feels that he is doing well on the Wellbutrin  and he would like to increase the dose. Pt wants to know if this will help his anxiety or not. Pt would like to know if  it is safe to take Xanax with Wellbutrin? Pt is going through marital problems at home and they are escalating. Patient states that he barely takes his Xanax at home. Patient is using half a pill once or twice a week at home. Patient is concerned about side effects of caffeine and Wellbutrin. Read that there eare some interactions. Patient is also concerned about the interactions of Wellbutrin and cannabis gummies - patient is not using cannabis currently. Patient is having trouble sleeping at night. Patient denies panic attacks, thoughts of harming himself or others, thoughts of suicide, or thoughts of depression at this time. RN instructed pt to call 911/ go to ED for any thoughts of harming himself or others/sucidal thoughts. Will forward to Dr. Murali Pham for review and advice. Patient says he got new insurance and it will not cover [facility] lab. Labs reordered for [facility] labs. RN advised pt to call insurance to inquire if they will cover labs done at [facility].

## 2023-05-11 NOTE — TELEPHONE ENCOUNTER
No interaction with Xanax and Wellbutrin. May take Xanax as needed for increased anxiety and panic      I am not aware of interactions between cannabis and Wellbutrin. Bupropion can be increased to 300 mg daily. Script sent for patient.

## 2023-05-31 RX ORDER — BUPROPION HYDROCHLORIDE 300 MG/1
TABLET ORAL
Qty: 30 TABLET | Refills: 0 | Status: SHIPPED | OUTPATIENT
Start: 2023-05-31

## 2023-07-12 ENCOUNTER — PATIENT MESSAGE (OUTPATIENT)
Dept: INTEGRATIVE MEDICINE | Facility: CLINIC | Age: 48
End: 2023-07-12

## 2023-07-13 RX ORDER — BUPROPION HYDROCHLORIDE 150 MG/1
150 TABLET ORAL DAILY
Qty: 30 TABLET | Refills: 0 | OUTPATIENT
Start: 2023-07-13

## 2023-07-13 RX ORDER — BUPROPION HYDROCHLORIDE 300 MG/1
300 TABLET ORAL DAILY
Qty: 30 TABLET | Refills: 0 | OUTPATIENT
Start: 2023-07-13

## 2023-07-13 NOTE — TELEPHONE ENCOUNTER
From: Ramon Jang  To: Emperatriz Martinez DO  Sent: 7/12/2023 9:13 PM CDT  Subject: Labs for upcoming visit    Hi Doc,    My next visit with you is July 26. Can you please submit the order for my usual lab work? Can you please add additional labs for A1C and insulin sensitivity? also, can we check for mycotoxin infection again?     Thanks

## 2023-07-21 ENCOUNTER — LAB ENCOUNTER (OUTPATIENT)
Dept: LAB | Facility: REFERENCE LAB | Age: 48
End: 2023-07-21
Attending: FAMILY MEDICINE
Payer: COMMERCIAL

## 2023-07-21 DIAGNOSIS — R53.82 CHRONIC FATIGUE: ICD-10-CM

## 2023-07-21 DIAGNOSIS — R74.8 ELEVATED LIVER ENZYMES: ICD-10-CM

## 2023-07-21 DIAGNOSIS — F41.9 ANXIETY: ICD-10-CM

## 2023-07-21 DIAGNOSIS — G61.0 AIDP (ACUTE INFLAMMATORY DEMYELINATING POLYNEUROPATHY) (HCC): ICD-10-CM

## 2023-07-21 DIAGNOSIS — R73.01 ELEVATED FASTING BLOOD SUGAR: ICD-10-CM

## 2023-07-21 DIAGNOSIS — R71.8 ELEVATED HEMATOCRIT: ICD-10-CM

## 2023-07-21 DIAGNOSIS — E78.5 HYPERLIPIDEMIA, UNSPECIFIED HYPERLIPIDEMIA TYPE: ICD-10-CM

## 2023-07-21 DIAGNOSIS — E34.8 ENDOCRINE AXIS DYSFUNCTION: ICD-10-CM

## 2023-07-21 LAB
ALBUMIN SERPL-MCNC: 3.5 G/DL (ref 3.4–5)
ALBUMIN/GLOB SERPL: 1.1 {RATIO} (ref 1–2)
ALP LIVER SERPL-CCNC: 66 U/L
ALT SERPL-CCNC: 42 U/L
ANION GAP SERPL CALC-SCNC: 8 MMOL/L (ref 0–18)
AST SERPL-CCNC: 24 U/L (ref 15–37)
BASOPHILS # BLD AUTO: 0.04 X10(3) UL (ref 0–0.2)
BASOPHILS NFR BLD AUTO: 0.6 %
BILIRUB SERPL-MCNC: 0.4 MG/DL (ref 0.1–2)
BUN BLD-MCNC: 19 MG/DL (ref 7–18)
BUN/CREAT SERPL: 16.7 (ref 10–20)
CALCIUM BLD-MCNC: 8.9 MG/DL (ref 8.5–10.1)
CHLORIDE SERPL-SCNC: 110 MMOL/L (ref 98–112)
CHOLEST SERPL-MCNC: 236 MG/DL (ref ?–200)
CO2 SERPL-SCNC: 22 MMOL/L (ref 21–32)
COMPLEXED PSA SERPL-MCNC: 1.22 NG/ML (ref ?–4)
CORTIS SERPL-MCNC: 11.6 UG/DL
CREAT BLD-MCNC: 1.14 MG/DL
CRP SERPL HS-MCNC: 0.7 MG/L (ref ?–3)
DEPRECATED RDW RBC AUTO: 40.2 FL (ref 35.1–46.3)
DHEA-S SERPL-MCNC: 96.4 UG/DL
EGFRCR SERPLBLD CKD-EPI 2021: 79 ML/MIN/1.73M2 (ref 60–?)
EOSINOPHIL # BLD AUTO: 0.14 X10(3) UL (ref 0–0.7)
EOSINOPHIL NFR BLD AUTO: 2.2 %
ERYTHROCYTE [DISTWIDTH] IN BLOOD BY AUTOMATED COUNT: 12.7 % (ref 11–15)
EST. AVERAGE GLUCOSE BLD GHB EST-MCNC: 103 MG/DL (ref 68–126)
ESTRADIOL SERPL-MCNC: 19.1 PG/ML
FASTING PATIENT LIPID ANSWER: YES
FASTING STATUS PATIENT QL REPORTED: YES
FSH SERPL-ACNC: <0.2 MIU/ML
GGT SERPL-CCNC: 24 U/L
GLOBULIN PLAS-MCNC: 3.3 G/DL (ref 2.8–4.4)
GLUCOSE BLD-MCNC: 110 MG/DL (ref 70–99)
HBA1C MFR BLD: 5.2 % (ref ?–5.7)
HCT VFR BLD AUTO: 47.3 %
HCYS SERPL-SCNC: 7.4 UMOL/L (ref 3.2–10.7)
HDLC SERPL-MCNC: 35 MG/DL (ref 40–59)
HGB BLD-MCNC: 16.1 G/DL
IMM GRANULOCYTES # BLD AUTO: 0.02 X10(3) UL (ref 0–1)
IMM GRANULOCYTES NFR BLD: 0.3 %
INSULIN SERPL-ACNC: 7.8 MU/L (ref 3–25)
LDLC SERPL CALC-MCNC: 131 MG/DL (ref ?–100)
LH SERPL-ACNC: <0.2 MIU/ML
LYMPHOCYTES # BLD AUTO: 1.79 X10(3) UL (ref 1–4)
LYMPHOCYTES NFR BLD AUTO: 28.1 %
MCH RBC QN AUTO: 29.4 PG (ref 26–34)
MCHC RBC AUTO-ENTMCNC: 34 G/DL (ref 31–37)
MCV RBC AUTO: 86.3 FL
MONOCYTES # BLD AUTO: 0.63 X10(3) UL (ref 0.1–1)
MONOCYTES NFR BLD AUTO: 9.9 %
NEUTROPHILS # BLD AUTO: 3.75 X10 (3) UL (ref 1.5–7.7)
NEUTROPHILS # BLD AUTO: 3.75 X10(3) UL (ref 1.5–7.7)
NEUTROPHILS NFR BLD AUTO: 58.9 %
NONHDLC SERPL-MCNC: 201 MG/DL (ref ?–130)
OSMOLALITY SERPL CALC.SUM OF ELEC: 293 MOSM/KG (ref 275–295)
PLATELET # BLD AUTO: 271 10(3)UL (ref 150–450)
POTASSIUM SERPL-SCNC: 4.1 MMOL/L (ref 3.5–5.1)
PROLACTIN SERPL-MCNC: 8.5 NG/ML
PROT SERPL-MCNC: 6.8 G/DL (ref 6.4–8.2)
RBC # BLD AUTO: 5.48 X10(6)UL
SODIUM SERPL-SCNC: 140 MMOL/L (ref 136–145)
T3FREE SERPL-MCNC: 2.37 PG/ML (ref 2.4–4.2)
T4 FREE SERPL-MCNC: 1.3 NG/DL (ref 0.8–1.7)
TRIGL SERPL-MCNC: 388 MG/DL (ref 30–149)
TSI SER-ACNC: 1.44 MIU/ML (ref 0.36–3.74)
VIT D+METAB SERPL-MCNC: 44.7 NG/ML (ref 30–100)
VLDLC SERPL CALC-MCNC: 72 MG/DL (ref 0–30)
WBC # BLD AUTO: 6.4 X10(3) UL (ref 4–11)

## 2023-07-21 PROCEDURE — 83001 ASSAY OF GONADOTROPIN (FSH): CPT | Performed by: FAMILY MEDICINE

## 2023-07-21 PROCEDURE — 80061 LIPID PANEL: CPT | Performed by: FAMILY MEDICINE

## 2023-07-21 PROCEDURE — 3044F HG A1C LEVEL LT 7.0%: CPT | Performed by: STUDENT IN AN ORGANIZED HEALTH CARE EDUCATION/TRAINING PROGRAM

## 2023-07-21 PROCEDURE — 36415 COLL VENOUS BLD VENIPUNCTURE: CPT | Performed by: FAMILY MEDICINE

## 2023-07-21 PROCEDURE — 86141 C-REACTIVE PROTEIN HS: CPT | Performed by: FAMILY MEDICINE

## 2023-07-21 PROCEDURE — 83002 ASSAY OF GONADOTROPIN (LH): CPT | Performed by: FAMILY MEDICINE

## 2023-07-21 PROCEDURE — 84305 ASSAY OF SOMATOMEDIN: CPT | Performed by: FAMILY MEDICINE

## 2023-07-21 PROCEDURE — 83036 HEMOGLOBIN GLYCOSYLATED A1C: CPT | Performed by: FAMILY MEDICINE

## 2023-07-21 PROCEDURE — 82533 TOTAL CORTISOL: CPT | Performed by: FAMILY MEDICINE

## 2023-07-21 PROCEDURE — 85025 COMPLETE CBC W/AUTO DIFF WBC: CPT | Performed by: FAMILY MEDICINE

## 2023-07-21 PROCEDURE — 84443 ASSAY THYROID STIM HORMONE: CPT | Performed by: FAMILY MEDICINE

## 2023-07-21 PROCEDURE — 82977 ASSAY OF GGT: CPT | Performed by: FAMILY MEDICINE

## 2023-07-21 PROCEDURE — 82627 DEHYDROEPIANDROSTERONE: CPT | Performed by: FAMILY MEDICINE

## 2023-07-21 PROCEDURE — 82306 VITAMIN D 25 HYDROXY: CPT | Performed by: FAMILY MEDICINE

## 2023-07-21 PROCEDURE — 84481 FREE ASSAY (FT-3): CPT | Performed by: FAMILY MEDICINE

## 2023-07-21 PROCEDURE — 80053 COMPREHEN METABOLIC PANEL: CPT | Performed by: FAMILY MEDICINE

## 2023-07-21 PROCEDURE — 83090 ASSAY OF HOMOCYSTEINE: CPT | Performed by: FAMILY MEDICINE

## 2023-07-21 PROCEDURE — 82670 ASSAY OF TOTAL ESTRADIOL: CPT | Performed by: FAMILY MEDICINE

## 2023-07-21 PROCEDURE — 84146 ASSAY OF PROLACTIN: CPT | Performed by: FAMILY MEDICINE

## 2023-07-21 PROCEDURE — 84410 TESTOSTERONE BIOAVAILABLE: CPT | Performed by: FAMILY MEDICINE

## 2023-07-21 PROCEDURE — 84439 ASSAY OF FREE THYROXINE: CPT | Performed by: FAMILY MEDICINE

## 2023-07-21 PROCEDURE — 83525 ASSAY OF INSULIN: CPT | Performed by: FAMILY MEDICINE

## 2023-07-25 LAB
IGF I: 247 NG/ML
IGF-1, Z SCORE: 1.7 S.D.

## 2023-07-26 LAB
SEX HORM BIND GLOB: 30.6 NMOL/L
TESTOST % FREE+WEAK BND: 29.6 %
TESTOST FREE+WEAK BND: 123.6 NG/DL
TESTOSTERONE TOT /MS: 417.4 NG/DL

## 2023-08-02 ENCOUNTER — PATIENT MESSAGE (OUTPATIENT)
Dept: INTEGRATIVE MEDICINE | Facility: CLINIC | Age: 48
End: 2023-08-02

## 2023-08-02 DIAGNOSIS — E78.5 HYPERLIPIDEMIA, UNSPECIFIED HYPERLIPIDEMIA TYPE: Primary | ICD-10-CM

## 2023-08-02 RX ORDER — CHOLESTYRAMINE 4 G/9G
1 POWDER, FOR SUSPENSION ORAL DAILY
Qty: 90 EACH | Refills: 0 | Status: SHIPPED | OUTPATIENT
Start: 2023-08-02

## 2023-08-02 RX ORDER — TESTOSTERONE CYPIONATE 200 MG/ML
INJECTION, SOLUTION INTRAMUSCULAR
Qty: 6 ML | Refills: 0 | Status: SHIPPED | OUTPATIENT
Start: 2023-08-02

## 2023-08-02 NOTE — TELEPHONE ENCOUNTER
From: Abhishek Black  To: Leesa Burrows DO  Sent: 8/2/2023 9:14 AM CDT  Subject: Medications     Hi Doc., per our conversation please submit a prescription for cholestyramine, fenofribrate and the high dose fish oil that you mentioned.      Also can you give me a referral for a calcium score test?

## 2023-08-03 ENCOUNTER — OFFICE VISIT (OUTPATIENT)
Dept: OPHTHALMOLOGY | Facility: CLINIC | Age: 48
End: 2023-08-03

## 2023-08-03 DIAGNOSIS — Z86.69 HISTORY OF CHALAZION: ICD-10-CM

## 2023-08-03 DIAGNOSIS — H52.4 PRESBYOPIA OF BOTH EYES: ICD-10-CM

## 2023-08-03 DIAGNOSIS — E11.9 DIET-CONTROLLED DIABETES MELLITUS (HCC): Primary | ICD-10-CM

## 2023-08-03 PROCEDURE — 92004 COMPRE OPH EXAM NEW PT 1/>: CPT | Performed by: OPHTHALMOLOGY

## 2023-08-03 RX ORDER — ROSUVASTATIN CALCIUM 5 MG/1
5 TABLET, COATED ORAL NIGHTLY
Qty: 90 TABLET | Refills: 0 | Status: SHIPPED | OUTPATIENT
Start: 2023-08-03

## 2023-08-03 RX ORDER — CHOLESTYRAMINE 4 G/9G
1 POWDER, FOR SUSPENSION ORAL DAILY
Qty: 90 EACH | Refills: 0 | OUTPATIENT
Start: 2023-08-03

## 2023-08-03 NOTE — ASSESSMENT & PLAN NOTE
Diet Controlled Diabetes. no background of retinopathy, no signs of neovascularization noted. Discussed ocular and systemic benefits of blood sugar control. Diagnosis and treatment discussed in detail with patient.

## 2023-08-03 NOTE — PATIENT INSTRUCTIONS
Diet-controlled diabetes mellitus ([facility] [address]  Diet Controlled Diabetes. no background of retinopathy, no signs of neovascularization noted. Discussed ocular and systemic benefits of blood sugar control. Diagnosis and treatment discussed in detail with patient. History of chalazion  Information given to see Dr. Kelsea Watts for consultation and treatment. Presbyopia of both eyes  Suggest +1.50 OTC reading glasses.

## 2023-08-03 NOTE — PROGRESS NOTES
Bienvenido Holland is a 49year old male. HPI:     HPI    NP. Pt in today for a complete eye exam and a bump evaluation on the REEMA. Pt complains of a \"bump\" on the REEMA that has been recurring on and off for a few years. Pt states he has been doing warm compresses about 2-3x a day and has been using Erythromycin ointment at bedtime which did help but \"bump\" keeps coming back. Pt's last eye exam was over 10 years ago and doesn't recall where he went. Pt denies any surgeries done to the eyes. Pt has been pre-diabetic for 1 year       Pt's diabetes is controlled by diet  Pt checks BS daily    Pt's last blood sugar was 103  Last HA1C was 5.2 on 7/21/23  Endocrinologist: none        Consult: per Dr. Ryan Sawyer  Last edited by Dennis Wilson, O.T. on 8/3/2023  2:23 PM.        Patient History:  Past Medical History:   Diagnosis Date    Anxiety     Guillain-Swoope ([address]     High cholesterol     Hyperlipidemia     Low testosterone     Non-alcoholic fatty liver disease     Tubular adenoma of colon 2023    repeat CLN in 2030       Surgical History: Bienvenido Holland has a past surgical history that includes hernia surgery; cyst removal; and colonoscopy (N/A, 2/22/2023) (Procedure: COLONOSCOPY;  Surgeon: Edgar Verdugo MD;  Location: [facility]).     Family History   Problem Relation Age of Onset    Diabetes Mother     High Cholesterol Mother     High Cholesterol Father     Glaucoma Neg     Macular degeneration Neg        Social History:   Social History     Socioeconomic History    Marital status:    Tobacco Use    Smoking status: Former     Packs/day: 0.00     Years: 0.00     Pack years: 0.00     Types: Cigarettes    Smokeless tobacco: Never   Vaping Use    Vaping Use: Never used   Substance and Sexual Activity    Alcohol use: Never    Drug use: Yes     Types: Cannabis   Other Topics Concern    Special Diet No       Medications:  Current Outpatient Medications   Medication Sig Dispense Refill rosuvastatin 5 MG Oral Tab Take 1 tablet (5 mg total) by mouth nightly. 90 tablet 0    Cholestyramine 4 g Oral Powd Pack Take 1 packet by mouth daily. 90 each 0    testosterone cypionate 200 mg/mL Intramuscular Solution inject 0.35 ml (70 mg) into the muscle every 5 days, 6 mL 0    Icosapent Ethyl (VASCEPA) 1 g Oral Cap Take 2 capsules by mouth in the morning and 2 capsules before bedtime. 120 capsule 1    Continuous Blood Gluc Sensor (FREESTYLE LENORA 2 SENSOR) Does not apply Misc 1 Units every 14 (fourteen) days. 2 each 0    Continuous Blood Gluc  (FREESTYLE LENORA 2 READER) Does not apply Device 1 Device As Directed. buPROPion  MG Oral Tablet 24 Hr Take 1 tablet (150 mg total) by mouth daily. 90 tablet 0    CUSTOM MEDICATION 22 gauge 1.5 inch needles   Use 1 needle every 5 days 30 each 2    CUSTOM MEDICATION Insulin Syringes with needle (0.5 ml/29 gauge/0.5 inches)  Use 1 syringe/needle daily for 30 days   Disp: 30 or 1 package 30 each 2    ALPRAZolam 0.5 MG Oral Tablet Dispersible Take 1 tablet (0.5 mg total) by mouth nightly as needed for Anxiety.  30 tablet 0       Allergies:  No Known Allergies    ROS:     ROS    Positive for: Endocrine, Eyes  Negative for: Constitutional, Gastrointestinal, Neurological, Skin, Genitourinary, Musculoskeletal, HENT, Cardiovascular, Respiratory, Psychiatric, Allergic/Imm, Heme/Lymph  Last edited by Reinaldo Jolley OT on 8/3/2023  1:58 PM.          PHYSICAL EXAM:     Base Eye Exam       Visual Acuity (Snellen - Linear)         Right Left    Dist sc 20/25 +2 20/25 -3    Near sc 20/30- 20/30-              Tonometry (Icare, 1:54 PM)         Right Left    Pressure 13 14              Pupils         Pupils    Right PERRL    Left PERRL              Visual Fields         Left Right     Full Full              Extraocular Movement         Right Left     Full, Ortho Full, Ortho              Dilation       Both eyes: 1.0% Mydriacyl and 2.5% Sumanth Synephrine @ 1:54 PM Slit Lamp and Fundus Exam       Slit Lamp Exam         Right Left    Lids/Lashes Meibomian gland dysfunction Meibomian gland dysfunction    Conjunctiva/Sclera Normal Normal    Cornea Clear Clear    Anterior Chamber Deep and quiet Deep and quiet    Iris Normal Normal    Lens Clear Clear    Vitreous Clear Clear              Fundus Exam         Right Left    Disc Good rim Good rim    C/D Ratio 0.1 0.1    Macula Normal Normal    Vessels Normal Normal    Periphery Normal Normal                  Refraction       Wearing Rx       Type: No glasses              Manifest Refraction (Auto)         Sphere Cylinder Axis    Right -0.75 +0.25 100    Left -0.75 +0.50 055   Pt declines refraction, will try OTC readers  NVA checked with +1.50 trial lens  Right eye:20/20, left eye: 20/20  Recommend +1.50 OTC readers                     ASSESSMENT/PLAN:     Diagnoses and Plan:     Diet-controlled diabetes mellitus ([address]  Diet Controlled Diabetes. no background of retinopathy, no signs of neovascularization noted. Discussed ocular and systemic benefits of blood sugar control. Diagnosis and treatment discussed in detail with patient. History of chalazion  Information given to see Dr. Chad Parada for consultation and treatment. Presbyopia of both eyes  Suggest +1.50 OTC reading glasses. No orders of the defined types were placed in this encounter. Meds This Visit:  Requested Prescriptions      No prescriptions requested or ordered in this encounter        Follow up instructions:  Return in about 2 years (around 8/3/2025) for DM Eye Exam; if started on diabetic medication then once a year.  .    8/3/2023  Scribed by: Janak Schulte MD

## 2023-08-08 ENCOUNTER — TELEPHONE (OUTPATIENT)
Dept: SURGERY | Facility: CLINIC | Age: 48
End: 2023-08-08

## 2023-08-09 ENCOUNTER — TELEPHONE (OUTPATIENT)
Dept: SURGERY | Facility: CLINIC | Age: 48
End: 2023-08-09

## 2023-08-09 ENCOUNTER — TELEPHONE (OUTPATIENT)
Dept: INTEGRATIVE MEDICINE | Facility: CLINIC | Age: 48
End: 2023-08-09

## 2023-08-09 NOTE — TELEPHONE ENCOUNTER
Pt requested to have generic version of Vascepa - Icosapentethyl sent to the pharmacy refill request to the pharmacy.

## 2023-08-11 ENCOUNTER — PATIENT MESSAGE (OUTPATIENT)
Dept: INTEGRATIVE MEDICINE | Facility: CLINIC | Age: 48
End: 2023-08-11

## 2023-08-14 NOTE — TELEPHONE ENCOUNTER
From: Pippa Balderas  To: Kaylen Lucas DO  Sent: 8/11/2023 4:20 PM CDT  Subject: Glucose Monitor    Hi,    I just received a letter from insurance that says we need to request a prior authorization for the glucose monitor, Freestyle Libr Kit 2 Sensor. Can you please request a prior authorization from my insurance?

## 2023-08-14 NOTE — TELEPHONE ENCOUNTER
S/w Rio who stated [place] told him he needs a prior authorization for Vasecpa and continuous blood glucose monitor. Pt advised to contact the office with pharmacy insurance vendor to start the prior authorizations. Pt agreed and will reach out to the office with this information.

## 2023-08-14 NOTE — TELEPHONE ENCOUNTER
Information pt provided for pharmacy insurance [employer] is inaccurate. S/w Félix Rodriguez confirmed pharmacy insurance is [employer] with member ID [id number]. S/w Des Dominique at [facility] could not confirm this is the correct pt since they have a different . No

## 2023-08-14 NOTE — TELEPHONE ENCOUNTER
S/w Gamaliel Duque at [employer] at [phone] was informed  remains incorrect and takes at least 24 hours for update from [employer].

## 2023-08-14 NOTE — TELEPHONE ENCOUNTER
S/w Rio informed his pharmacy insurance has a different  with same name and address. Pt advised to call his insurance to verify his  and once corrected to call the office to start pior authorizations. Pt voiced agreed and voiced understanding.

## 2023-08-14 NOTE — TELEPHONE ENCOUNTER
[facility] notification that RN will  re-attempt contact with pharmacy insurance for prior authorization.

## 2023-08-15 ENCOUNTER — OFFICE VISIT (OUTPATIENT)
Dept: SURGERY | Facility: CLINIC | Age: 48
End: 2023-08-15

## 2023-08-15 VITALS — BODY MASS INDEX: 24 KG/M2 | WEIGHT: 180 LBS

## 2023-08-15 DIAGNOSIS — K64.2 GRADE III HEMORRHOIDS: Primary | ICD-10-CM

## 2023-08-15 PROCEDURE — 99204 OFFICE O/P NEW MOD 45 MIN: CPT | Performed by: SURGERY

## 2023-08-15 PROCEDURE — 46600 DIAGNOSTIC ANOSCOPY SPX: CPT | Performed by: SURGERY

## 2023-08-15 NOTE — PROCEDURES
[facility] [address], [facility]     Procedure Report    Patient Name:  Sobeida Velez  MR:  [id number]  :  1975  DOS:  08/15/23    Preop Dx:   Grade III hemorrhoids  (primary encounter diagnosis)   SNOMED CT(R): INTERNAL HEMORRHOIDS GRADE III    Postop Dx:   Grade III hemorrhoids  (primary encounter diagnosis)   SNOMED CT(R): INTERNAL HEMORRHOIDS GRADE III    Procedure: Anoscopy ([id number])  Surgeon:  Wallace Haile MD  Complication:  None    INDICATION:  Pt is a 49year old male who with  Grade III hemorrhoids  (primary encounter diagnosis)   SNOMED CT(R): INTERNAL HEMORRHOIDS GRADE III    who is scheduled for an anoscopy. TECHNIQUE:  The patient was placed in prone position and the perirectal area was draped appropriately. 5% lidocaine gel was applied to the anal area. An anoscope was inserted into the rectum. Grade III hemorrhoids were seen in all three pillars. Pt tolerated the procedure well.     Wallace Haile MD

## 2023-08-28 ENCOUNTER — PATIENT MESSAGE (OUTPATIENT)
Dept: INTEGRATIVE MEDICINE | Facility: CLINIC | Age: 48
End: 2023-08-28

## 2023-08-31 RX ORDER — BUPROPION HYDROCHLORIDE 150 MG/1
150 TABLET, EXTENDED RELEASE ORAL 2 TIMES DAILY
Qty: 60 TABLET | Refills: 0 | Status: SHIPPED | OUTPATIENT
Start: 2023-08-31

## 2023-09-11 ENCOUNTER — PATIENT MESSAGE (OUTPATIENT)
Dept: INTEGRATIVE MEDICINE | Facility: CLINIC | Age: 48
End: 2023-09-11

## 2023-09-12 ENCOUNTER — OFFICE VISIT (OUTPATIENT)
Dept: SURGERY | Facility: CLINIC | Age: 48
End: 2023-09-12

## 2023-09-12 DIAGNOSIS — K64.2 GRADE III HEMORRHOIDS: Primary | ICD-10-CM

## 2023-09-12 PROCEDURE — 46221 LIGATION OF HEMORRHOID(S): CPT | Performed by: SURGERY

## 2023-09-12 RX ORDER — BLOOD SUGAR DIAGNOSTIC
STRIP MISCELLANEOUS
Qty: 100 STRIP | Refills: 3 | Status: SHIPPED | OUTPATIENT
Start: 2023-09-12 | End: 2024-09-10

## 2023-09-12 RX ORDER — LANCETS
1 EACH MISCELLANEOUS DAILY
Qty: 100 EACH | Refills: 0 | Status: SHIPPED | OUTPATIENT
Start: 2023-09-12 | End: 2024-09-11

## 2023-09-12 RX ORDER — BLOOD-GLUCOSE METER
1 EACH MISCELLANEOUS DAILY
Qty: 1 KIT | Refills: 0 | Status: SHIPPED | OUTPATIENT
Start: 2023-09-12

## 2023-09-26 ENCOUNTER — OFFICE VISIT (OUTPATIENT)
Dept: SURGERY | Facility: CLINIC | Age: 48
End: 2023-09-26

## 2023-09-26 VITALS — WEIGHT: 180 LBS | BODY MASS INDEX: 24 KG/M2

## 2023-09-26 DIAGNOSIS — K64.2 GRADE III HEMORRHOIDS: Primary | ICD-10-CM

## 2023-09-26 PROCEDURE — 46221 LIGATION OF HEMORRHOID(S): CPT | Performed by: SURGERY

## 2023-09-27 NOTE — PROCEDURES
[facility] [address], [place]     Operative Report    Patient Name:  Abhishek Black  MR:  [id number]  :  1975  DOS:  23    Preop Dx:  Grade III internal hemorrhoids  Postop Dx:  Grade III internal hemorrhoids  Procedure:  Ligation of hemorrhoids (CPT 63379)  Surgeon:  Douglas Vo MD  EBL: None  Complication:  None    INDICATION:  Pt is a 51year old male who with grade III internal hemorrhoids who is scheduled for a banding procedure. CONSENT:  An informed consent discussion was held with the patient regarding the nature of hemorrhoids, the treatment options and the details of the procedure. The risks including but not limited to bleeding, wound infection, and recurrence were discussed. The patient expressed understanding and want to proceed with the planned procedure. TECHNIQUE:  The patient was placed in prone jackknife position. The rectum and perineum were prep and draped in the usual fashion. Lidocaine 5% ointment was used for topical anesthesia. Rectal digital exam confirmed no other pathology. The anoscope was gently inserted and advanced into the anal canal.  The left lateral hemorrhoidal tissue was visualized. The Rubber Band Ligator was carefully applied with suction. The ligator trigger was activated to produce rubber banding of the hemorrhoid. The patient did not report pain and was discharged in stable condition. Routine wound care instructions provided.     Douglas Vo MD

## 2023-10-10 NOTE — TELEPHONE ENCOUNTER
JUDITH Oran requesting refill for Testosterone and Alprazolam.    A refill request was received for:  Requested Prescriptions     Pending Prescriptions Disp Refills    testosterone cypionate 200 mg/mL Intramuscular Solution 6 mL 0     Sig: Inject 0.35 ml (70 mg) into the muscle every 5 days. ALPRAZolam 0.5 MG Oral Tab 30 tablet 0     Sig: Take 1 tablet (0.5 mg total) by mouth nightly as needed. Last refill date:  Testosterone cypionate 8/2/23; Alprazolam 3/27/23  Qty: Testosterone cypionate 6 ml; Alprazolam # 30. Dx: Endocrine axis dysfunction, Depression/anxiety   Last office visit: 3/27/23  When is follow up due: 3 months, NOV:11/2/23     For hormone prescriptions, date of last blood test for rx being requested:7/21/23 total testosterone 417.4    Future Appointments   Date Time Provider Elsie Colmenares   10/17/2023  2:20 PM Kyle Masterson MD [facility]   11/2/2023 10:00 AM Olegario Elkins DO [facility]     Testosterone cypionate 200 mg/ml 70 mg with last office note on 3/27/23 50 mg    Testosterone cypionate 200 mg/ml 70 mg, # 6 ml pended; authorize if appropriate. Alprazolam 0.5 mg, # 30 pended; authorize if appropriate.

## 2023-10-11 RX ORDER — LANCETS 33 GAUGE
1 EACH MISCELLANEOUS 2 TIMES DAILY
Qty: 100 EACH | Refills: 0 | Status: SHIPPED | OUTPATIENT
Start: 2023-10-11

## 2023-10-11 RX ORDER — ALPRAZOLAM 0.5 MG/1
0.5 TABLET ORAL NIGHTLY PRN
Qty: 30 TABLET | Refills: 0 | Status: SHIPPED | OUTPATIENT
Start: 2023-10-11

## 2023-10-11 RX ORDER — ALPRAZOLAM 0.5 MG/1
0.5 TABLET, ORALLY DISINTEGRATING ORAL NIGHTLY PRN
Qty: 30 TABLET | Refills: 0 | OUTPATIENT
Start: 2023-10-11

## 2023-10-11 RX ORDER — TESTOSTERONE CYPIONATE 200 MG/ML
INJECTION, SOLUTION INTRAMUSCULAR
Qty: 6 ML | Refills: 0 | Status: SHIPPED | OUTPATIENT
Start: 2023-10-11

## 2023-10-11 RX ORDER — TESTOSTERONE CYPIONATE 200 MG/ML
INJECTION, SOLUTION INTRAMUSCULAR
Qty: 6 ML | Refills: 0 | OUTPATIENT
Start: 2023-10-11

## 2023-10-17 ENCOUNTER — OFFICE VISIT (OUTPATIENT)
Dept: SURGERY | Facility: CLINIC | Age: 48
End: 2023-10-17

## 2023-10-17 VITALS — WEIGHT: 180 LBS | BODY MASS INDEX: 24 KG/M2

## 2023-10-17 DIAGNOSIS — K64.2 GRADE III HEMORRHOIDS: Primary | ICD-10-CM

## 2023-10-17 PROCEDURE — 46221 LIGATION OF HEMORRHOID(S): CPT | Performed by: SURGERY

## 2023-10-17 NOTE — PROCEDURES
[facility] [address], [facility]     Operative Report    Patient Name:  Velma Sanderson  MR:  [id number]  :  1975  DOS:  10/17/23    Preop Dx:  Grade III internal hemorrhoids  Postop Dx:  Grade III internal hemorrhoids  Procedure:  Ligation of hemorrhoids (CPT 12515)  Surgeon:  Yohana Barnes MD  EBL: None  Complication:  None    INDICATION:  Pt is a 51year old male who with grade III internal hemorrhoids who is scheduled for a banding procedure. CONSENT:  An informed consent discussion was held with the patient regarding the nature of hemorrhoids, the treatment options and the details of the procedure. The risks including but not limited to bleeding, wound infection, and recurrence were discussed. The patient expressed understanding and want to proceed with the planned procedure. TECHNIQUE:  The patient was placed in prone jackknife position. The rectum and perineum were prep and draped in the usual fashion. Lidocaine 5% ointment was used for topical anesthesia. Rectal digital exam confirmed no other pathology. The anoscope was gently inserted and advanced into the anal canal.  The right anterior hemorrhoidal tissue was visualized. The Rubber Band Ligator was carefully applied with suction. The ligator trigger was activated to produce rubber banding of the hemorrhoid. The patient did not report pain and was discharged in stable condition. Routine wound care instructions provided.     Yohana Barnes MD

## 2023-10-26 ENCOUNTER — HOSPITAL ENCOUNTER (OUTPATIENT)
Age: 48
Discharge: HOME | End: 2023-10-26

## 2023-10-26 VITALS
TEMPERATURE: 99 F | RESPIRATION RATE: 18 BRPM | HEART RATE: 63 BPM | DIASTOLIC BLOOD PRESSURE: 78 MMHG | SYSTOLIC BLOOD PRESSURE: 125 MMHG | OXYGEN SATURATION: 96 %

## 2023-10-26 DIAGNOSIS — L03.811 CELLULITIS OF SCALP: Primary | ICD-10-CM

## 2023-10-26 PROCEDURE — 99213 OFFICE O/P EST LOW 20 MIN: CPT | Performed by: NURSE PRACTITIONER

## 2023-10-26 RX ORDER — NAPROXEN 500 MG/1
500 TABLET ORAL 2 TIMES DAILY PRN
Qty: 20 TABLET | Refills: 0 | Status: SHIPPED | OUTPATIENT
Start: 2023-10-26 | End: 2023-11-05

## 2023-10-26 RX ORDER — CEPHALEXIN 500 MG/1
500 CAPSULE ORAL 4 TIMES DAILY
Qty: 40 CAPSULE | Refills: 0 | Status: SHIPPED | OUTPATIENT
Start: 2023-10-26 | End: 2023-11-05

## 2023-10-26 NOTE — ED INITIAL ASSESSMENT (HPI)
Pt states noticed some red bumps on top of scalp last night. Pt states has a lump behind right ear that he's always had but since noticing the red bump now is having pain in that area as well. Pt denies any recent sickness, or illness.  Pt states does have hx of cyst.

## 2023-11-02 ENCOUNTER — PATIENT MESSAGE (OUTPATIENT)
Dept: INTEGRATIVE MEDICINE | Facility: CLINIC | Age: 48
End: 2023-11-02

## 2023-11-02 ENCOUNTER — OFFICE VISIT (OUTPATIENT)
Dept: INTEGRATIVE MEDICINE | Facility: CLINIC | Age: 48
End: 2023-11-02
Payer: COMMERCIAL

## 2023-11-02 VITALS
OXYGEN SATURATION: 98 % | DIASTOLIC BLOOD PRESSURE: 80 MMHG | SYSTOLIC BLOOD PRESSURE: 110 MMHG | HEART RATE: 86 BPM | BODY MASS INDEX: 24 KG/M2 | WEIGHT: 173.38 LBS

## 2023-11-02 DIAGNOSIS — R73.01 ELEVATED FASTING BLOOD SUGAR: ICD-10-CM

## 2023-11-02 DIAGNOSIS — R53.82 CHRONIC FATIGUE: ICD-10-CM

## 2023-11-02 DIAGNOSIS — R79.89 ELEVATED HOMOCYSTEINE: ICD-10-CM

## 2023-11-02 DIAGNOSIS — E78.5 HYPERLIPIDEMIA, UNSPECIFIED HYPERLIPIDEMIA TYPE: ICD-10-CM

## 2023-11-02 DIAGNOSIS — F41.9 ANXIETY: ICD-10-CM

## 2023-11-02 DIAGNOSIS — E34.8 ENDOCRINE AXIS DYSFUNCTION: ICD-10-CM

## 2023-11-02 DIAGNOSIS — I89.0 LYMPHEDEMA: Primary | ICD-10-CM

## 2023-11-02 DIAGNOSIS — G61.0 AIDP (ACUTE INFLAMMATORY DEMYELINATING POLYNEUROPATHY) (HCC): ICD-10-CM

## 2023-11-02 DIAGNOSIS — R74.8 ELEVATED LIVER ENZYMES: ICD-10-CM

## 2023-11-02 DIAGNOSIS — F43.0 STRESS REACTION: ICD-10-CM

## 2023-11-02 PROCEDURE — 3079F DIAST BP 80-89 MM HG: CPT | Performed by: FAMILY MEDICINE

## 2023-11-02 PROCEDURE — 99214 OFFICE O/P EST MOD 30 MIN: CPT | Performed by: FAMILY MEDICINE

## 2023-11-02 PROCEDURE — 3074F SYST BP LT 130 MM HG: CPT | Performed by: FAMILY MEDICINE

## 2023-11-02 RX ORDER — FLUOXETINE 10 MG/1
10 CAPSULE ORAL DAILY
Qty: 30 CAPSULE | Refills: 1 | Status: SHIPPED | OUTPATIENT
Start: 2023-11-02

## 2023-11-02 RX ORDER — ALPRAZOLAM 0.5 MG/1
0.5 TABLET ORAL NIGHTLY PRN
Qty: 30 TABLET | Refills: 1 | Status: SHIPPED | OUTPATIENT
Start: 2023-11-02

## 2023-11-02 NOTE — PATIENT INSTRUCTIONS
Inositol is a component of the B-complex family. It supports healthy  central nervous system function, including emotional wellness, healthy  mood and behavior. Additionally, it may promote ovarian health    Dose: 2 scoops, 1 times daily, with or between meals      L-Theanine is a natural component of green tea that is  responsible for its characteristic taste. l-Theanine is also  believed to be the component in green tea responsible for  its relaxation effects. *    Dose: 1-2 capsules 1-3 times per day (Dose 200 to 400 mg up to 3 times per day)

## 2023-11-03 NOTE — TELEPHONE ENCOUNTER
From: Mitchell Hubbard  To: Agnieszka Bello [facility]  Sent: 11/2/2023 8:29 PM CDT  Subject: Lipid test    Please add the lipid panel to my labs (cholesterol).      Thank you

## 2023-11-03 NOTE — TELEPHONE ENCOUNTER
Pt is requesting repeat lipid panel to current lab orders. Last lipid panel was 7/21/23. Please advise.

## 2023-11-21 ENCOUNTER — OFFICE VISIT (OUTPATIENT)
Dept: SURGERY | Facility: CLINIC | Age: 48
End: 2023-11-21
Payer: COMMERCIAL

## 2023-11-21 DIAGNOSIS — K64.2 GRADE III HEMORRHOIDS: Primary | ICD-10-CM

## 2023-11-21 PROCEDURE — 46221 LIGATION OF HEMORRHOID(S): CPT | Performed by: SURGERY

## 2023-11-22 NOTE — PROCEDURES
[facility] [address], [facility]     Operative Report    Patient Name:  Roberto Lou  MR:  [id number]  :  1975  DOS:  23    Preop Dx:  Grade III internal hemorrhoids  Postop Dx:  Grade III internal hemorrhoids  Procedure:  Ligation of hemorrhoids (CPT 20299)  Surgeon:  Lesley Lui MD  EBL: None  Complication:  None    INDICATION:  Pt is a 51year old male who with grade III internal hemorrhoids who is scheduled for a banding procedure. CONSENT:  An informed consent discussion was held with the patient regarding the nature of hemorrhoids, the treatment options and the details of the procedure. The risks including but not limited to bleeding, wound infection, and recurrence were discussed. The patient expressed understanding and want to proceed with the planned procedure. TECHNIQUE:  The patient was placed in prone jackknife position. The rectum and perineum were prep and draped in the usual fashion. Lidocaine 5% ointment was used for topical anesthesia. Rectal digital exam confirmed no other pathology. The anoscope was gently inserted and advanced into the anal canal.  The left anterior hemorrhoidal tissue was visualized. The Rubber Band Ligator was carefully applied with suction. The ligator trigger was activated to produce rubber banding of the hemorrhoid. The patient did not report pain and was discharged in stable condition. Routine wound care instructions provided.     Lesley Lui MD

## 2023-12-05 ENCOUNTER — HOSPITAL ENCOUNTER (OUTPATIENT)
Dept: ULTRASOUND IMAGING | Age: 48
Discharge: HOME | End: 2023-12-05
Attending: FAMILY MEDICINE
Payer: COMMERCIAL

## 2023-12-05 ENCOUNTER — OFFICE VISIT (OUTPATIENT)
Dept: SURGERY | Facility: CLINIC | Age: 48
End: 2023-12-05

## 2023-12-05 VITALS — WEIGHT: 173 LBS | BODY MASS INDEX: 23 KG/M2

## 2023-12-05 DIAGNOSIS — I89.0 LYMPHEDEMA: ICD-10-CM

## 2023-12-05 DIAGNOSIS — K64.2 GRADE III HEMORRHOIDS: Primary | ICD-10-CM

## 2023-12-05 PROCEDURE — 46221 LIGATION OF HEMORRHOID(S): CPT | Performed by: SURGERY

## 2023-12-05 PROCEDURE — 76536 US EXAM OF HEAD AND NECK: CPT | Performed by: FAMILY MEDICINE

## 2023-12-05 NOTE — PROCEDURES
[facility] [address], [facility]     Operative Report    Patient Name:  Lee Loza  MR:  [id number]  :  1975  DOS:  23    Preop Dx:  Grade III internal hemorrhoids  Postop Dx:  Grade III internal hemorrhoids  Procedure:  Ligation of hemorrhoids (CPT 65262)  Surgeon:  Holland Dao MD  EBL: None  Complication:  None    INDICATION:  Pt is a 51year old male who with grade III internal hemorrhoids who is scheduled for a banding procedure. CONSENT:  An informed consent discussion was held with the patient regarding the nature of hemorrhoids, the treatment options and the details of the procedure. The risks including but not limited to bleeding, wound infection, and recurrence were discussed. The patient expressed understanding and want to proceed with the planned procedure. TECHNIQUE:  The patient was placed in prone jackknife position. The rectum and perineum were prep and draped in the usual fashion. Lidocaine 5% ointment was used for topical anesthesia. Rectal digital exam confirmed no other pathology. The anoscope was gently inserted and advanced into the anal canal.  The right posterior hemorrhoidal tissue was visualized. The Rubber Band Ligator was carefully applied with suction. The ligator trigger was activated to produce rubber banding of the hemorrhoid. The patient did not report pain and was discharged in stable condition. Routine wound care instructions provided.     Holland Dao MD

## 2023-12-07 ENCOUNTER — TELEPHONE (OUTPATIENT)
Dept: CARDIOLOGY | Age: 48
End: 2023-12-07

## 2023-12-08 ENCOUNTER — OFFICE VISIT (OUTPATIENT)
Dept: FAMILY MEDICINE CLINIC | Facility: CLINIC | Age: 48
End: 2023-12-08
Payer: COMMERCIAL

## 2023-12-08 VITALS
SYSTOLIC BLOOD PRESSURE: 110 MMHG | WEIGHT: 167.63 LBS | BODY MASS INDEX: 23 KG/M2 | HEART RATE: 60 BPM | DIASTOLIC BLOOD PRESSURE: 80 MMHG | OXYGEN SATURATION: 98 %

## 2023-12-08 DIAGNOSIS — B35.6 TINEA CRURIS: ICD-10-CM

## 2023-12-08 DIAGNOSIS — R22.9 LOCALIZED SKIN MASS, LUMP, OR SWELLING: ICD-10-CM

## 2023-12-08 DIAGNOSIS — D18.1 HYGROMA: Primary | ICD-10-CM

## 2023-12-08 PROCEDURE — 99214 OFFICE O/P EST MOD 30 MIN: CPT | Performed by: STUDENT IN AN ORGANIZED HEALTH CARE EDUCATION/TRAINING PROGRAM

## 2023-12-08 PROCEDURE — 3079F DIAST BP 80-89 MM HG: CPT | Performed by: STUDENT IN AN ORGANIZED HEALTH CARE EDUCATION/TRAINING PROGRAM

## 2023-12-08 PROCEDURE — 3074F SYST BP LT 130 MM HG: CPT | Performed by: STUDENT IN AN ORGANIZED HEALTH CARE EDUCATION/TRAINING PROGRAM

## 2023-12-08 RX ORDER — FLUOXETINE HYDROCHLORIDE 20 MG/1
20 CAPSULE ORAL DAILY
COMMUNITY

## 2023-12-08 RX ORDER — CLOTRIMAZOLE 1 %
1 CREAM (GRAM) TOPICAL 2 TIMES DAILY
Qty: 28 G | Refills: 0 | Status: SHIPPED | OUTPATIENT
Start: 2023-12-08

## 2023-12-14 ENCOUNTER — APPOINTMENT (OUTPATIENT)
Dept: CT IMAGING | Age: 48
End: 2023-12-14

## 2023-12-14 DIAGNOSIS — E78.5 HYPERLIPIDEMIA, UNSPECIFIED: Primary | ICD-10-CM

## 2024-01-05 ENCOUNTER — PATIENT MESSAGE (OUTPATIENT)
Dept: FAMILY MEDICINE CLINIC | Facility: CLINIC | Age: 49
End: 2024-01-05

## 2024-01-05 DIAGNOSIS — K64.8 OTHER HEMORRHOIDS: Primary | ICD-10-CM

## 2024-01-05 NOTE — TELEPHONE ENCOUNTER
From: Rio Johnson  To: Carlos A Hu  Sent: 1/5/2024 10:23 AM CST  Subject: Referral Renewal    Hello,    Can you please renew my referral to Dr. Kaushik Carter for hemorrhoid removal?    Thanks

## 2024-01-05 NOTE — TELEPHONE ENCOUNTER
Pt requesting new referral for follow-up general surgeon Dr. Westfall for hemorrhoid surgery.         Surgical referral pended; authorize if appropriate.

## 2024-01-12 ENCOUNTER — PATIENT MESSAGE (OUTPATIENT)
Dept: FAMILY MEDICINE CLINIC | Facility: CLINIC | Age: 49
End: 2024-01-12

## 2024-01-12 DIAGNOSIS — R79.89 LOW TESTOSTERONE: Primary | ICD-10-CM

## 2024-01-15 NOTE — TELEPHONE ENCOUNTER
A refill request was received for:  Requested Prescriptions     Pending Prescriptions Disp Refills    testosterone cypionate 200 mg/mL Intramuscular Solution 6 mL 0     Sig: Inject 0.35 ml (70 mg) into the muscle every 5 days.     Last refill date:  10/11/23  Qty: 6 mL  Dx: BHRT   Last office visit: 12/8/23   When is follow up due: needs integrative follow up    For hormone prescriptions, date of last blood test for rx being requested:    Future Appointments   Date Time Provider Department Center   1/16/2024 11:50 AM [facility]

## 2024-01-15 NOTE — TELEPHONE ENCOUNTER
From: Rio Johnson  To: Carlos A Hu  Sent: 1/12/2024 11:57 AM CST  Subject: Prescription refill    [facility] Dr. Hu,    Can you please send a refill or new prescription for my testosterone? My chart won’t let me do it. Thanks.

## 2024-01-16 RX ORDER — TESTOSTERONE CYPIONATE 200 MG/ML
INJECTION, SOLUTION INTRAMUSCULAR
Qty: 6 ML | Refills: 0 | Status: SHIPPED | OUTPATIENT
Start: 2024-01-16 | End: 2024-01-17

## 2024-01-17 ENCOUNTER — OFFICE VISIT (OUTPATIENT)
Dept: INTEGRATIVE MEDICINE | Facility: CLINIC | Age: 49
End: 2024-01-17
Payer: COMMERCIAL

## 2024-01-17 VITALS
HEIGHT: 72 IN | SYSTOLIC BLOOD PRESSURE: 110 MMHG | WEIGHT: 171.38 LBS | DIASTOLIC BLOOD PRESSURE: 60 MMHG | OXYGEN SATURATION: 96 % | HEART RATE: 80 BPM | BODY MASS INDEX: 23.21 KG/M2

## 2024-01-17 DIAGNOSIS — R79.89 LOW TESTOSTERONE: Primary | ICD-10-CM

## 2024-01-17 DIAGNOSIS — E78.00 HIGH CHOLESTEROL: ICD-10-CM

## 2024-01-17 DIAGNOSIS — F41.9 ANXIETY: ICD-10-CM

## 2024-01-17 DIAGNOSIS — R73.01 ELEVATED FASTING BLOOD SUGAR: ICD-10-CM

## 2024-01-17 PROCEDURE — 3074F SYST BP LT 130 MM HG: CPT | Performed by: PHYSICIAN ASSISTANT

## 2024-01-17 PROCEDURE — 99215 OFFICE O/P EST HI 40 MIN: CPT | Performed by: PHYSICIAN ASSISTANT

## 2024-01-17 PROCEDURE — 3078F DIAST BP <80 MM HG: CPT | Performed by: PHYSICIAN ASSISTANT

## 2024-01-17 PROCEDURE — 3008F BODY MASS INDEX DOCD: CPT | Performed by: PHYSICIAN ASSISTANT

## 2024-01-17 RX ORDER — BLOOD SUGAR DIAGNOSTIC
STRIP MISCELLANEOUS
Qty: 200 STRIP | Refills: 0 | Status: SHIPPED | OUTPATIENT
Start: 2024-01-17 | End: 2025-01-15

## 2024-01-17 RX ORDER — CHOLESTYRAMINE 4 G/9G
1 POWDER, FOR SUSPENSION ORAL DAILY
Qty: 90 PACKET | Refills: 0 | Status: SHIPPED | OUTPATIENT
Start: 2024-01-17 | End: 2024-04-16

## 2024-01-17 RX ORDER — ALPRAZOLAM 0.5 MG/1
0.5 TABLET ORAL NIGHTLY PRN
Qty: 30 TABLET | Refills: 1 | Status: SHIPPED | OUTPATIENT
Start: 2024-01-17

## 2024-01-17 RX ORDER — LANCETS 33 GAUGE
1 EACH MISCELLANEOUS 2 TIMES DAILY
Qty: 200 EACH | Refills: 0 | Status: SHIPPED | OUTPATIENT
Start: 2024-01-17 | End: 2024-04-16

## 2024-01-17 RX ORDER — TESTOSTERONE CYPIONATE 200 MG/ML
INJECTION, SOLUTION INTRAMUSCULAR
Qty: 6 ML | Refills: 0 | Status: SHIPPED | OUTPATIENT
Start: 2024-01-17 | End: 2025-01-17

## 2024-01-17 NOTE — PROGRESS NOTES
Rio Johnson is a 48 year old male.  Chief Complaint   Patient presents with    Follow - Up     Patient presents for follow up on testosterone.        HPI:   Rio presents for follow up,     He has been on cholestryramine for cholesterol in the past. He was having a hard time tolerating it. He is now taking supplements     For stress he has tried Wellbutrin     Hormones - He is on testosterone 0.35ml every 5 days. No libido or erection issues. He was on hormones for IVF.     GI - no concerns       Lifestyle Factors affecting health:   Diet - He tried to do low carb, low sugar intermittent fasting. He will eat sour dough bread. He has fallen off for the holidays and trying to get back on. He will have high good fats. Avoids seed oils. He drops weight quickly and will get groggy.     Morning - pink sea salt and water  Hummus, cheese sourdough bread.     Exercise -He gos through phases. Where he is going to the gym a few times a week.      Stress - work and marriage stress. He has a young child. Stress and anxiety worsens his sleep. Working out and avoiding stimulants help.     He is taking business classes    He is struggling with [place] is happening in [place]     Sleep - He has a racing mind when he tries to sleep. He takes magnesium glycinate and glycine at night and l theanine.   When he has a racing mind he will take xanax. He will do that once a week max if he is not getting sleep.     He can fall asleep when he is really tired but then he wakes up 4 hours later.     He had Guillain-Barré syndrome and has residual fatigue and pain. He burns out fast.   He will take a amino acid drink and pre work out to help physical and mental boost.       HPI:     49 yo present for follow up.     Off Bupropion for 2 months and notes overall doing well. Felt to many side effects with increased increased.  Notes overall marriage stress had reduced.     Having increased stress around conflict is [place]. Feels as  though he is in a constant states of stress and worry.  Reports he is having nightmares and having frequent nighttime awakenings with panic.    Using alprazolam once a week at this time.  Feels as though he could use it more however has fear around brother with addiction.    Stop taking rosuvastatin at this time.  Understands need for this medication however does not feel that he can tolerate it.  Taking red yeast rice.    Recently seen in  for infection of scalp wound.  Reports overall this is resolving while taking antibiotics.  He notes some consistent painful nodules behind his right ear and neck region.  Notes symptoms are constant and stable in severity.  Notes no associated fever, chills, discharge or redness.    [facility] Lab Encounter on 07/21/2023   Component Date Value Ref Range Status    Prostate Specific Antigen Screen 07/21/2023 1.22  <=4.00 ng/mL Final    Comment: INTERPRETIVE INFORMATION: TOTAL PROSTATE SPECIFIC ANTIGEN:    The Siemens Total PSA(TPSA)chemiluminescent (LOCI) immunoassay was used.  Results obtained with different test methods or kits cannot be used interchangeably.  The Siemens TPSA is approved for use as an aid in the detection of prostate cancer when used in conjuction with a digital rectal exam in men age 50 or older.    The Siemens TPSA method is also indicated for use as an aid in the management monitoring of prostate cancer patients.  Elevated PSA concentrations can only suggest the presence of prostate cancer until biopsy is performed, which is required for diagnosis of cancer.  PSA concentrations can be elevated in the prostate.  PSA is generally not elevated in healthy men or men with non-prostatic carcinoma.        This test may exhibit interference when a sample is collected from a person who is consuming high dose of biotin (a.k.a., vitamin B7, vitamin H, coenzyme R) supplements resulting in serum concentrations >100                            ng/mL.  Intake of the recommended  daily allowance (RDA) for biotin (0.03 mg) has not been shown to typically cause significant interference; however, high dose daily dietary supplements may contain biotin concentrations greater than 150 times (5-10 mg) the RDA.  It is recommended that physicians ask all patients who may be on biotin supplementation to stop biotin consumption at least 72 hours prior to collection of a new sample.          US HEAD/NECK (CPT=76536)    Result Date: 12/6/2023  CONCLUSION:   Small subcentimeter short axis nonenlarged lymph nodes in the right neck corresponding to the areas of concern.  0.8 x 0.6 x 0.7 cm simple appearing cystic structure in the right neck soft tissues.  There is a history of previous right neck cystic hygroma drainage and a small residual or recurrent cystic hygroma is not excluded.  Findings could also reflect a postoperative seroma or other etiology.  Advise clinical follow-up and consider follow-up contrast enhanced CT of the neck soft tissues.    Dictated by (CST): Boo Chávez MD on 12/06/2023 at 9:48 AM     Finalized by (CST): Boo Chávez MD on 12/06/2023 at 9:55 AM           REVIEW OF SYSTEMS:   Review of Systems   Constitutional:  Positive for fatigue.   Gastrointestinal:  Negative for abdominal distention, abdominal pain, constipation and diarrhea.   Psychiatric/Behavioral:  Positive for sleep disturbance. The patient is nervous/anxious.             FAMILY HISTORY:      Family History   Problem Relation Age of Onset    Diabetes Mother     High Cholesterol Mother     High Cholesterol Father     Glaucoma Neg     Macular degeneration Neg        MEDICAL HISTORY:     Past Medical History:   Diagnosis Date    Anxiety     Diet-controlled diabetes mellitus (HCC) 08/03/2023    Guillain-[place] (HCC)     High cholesterol     Hyperlipidemia     Low testosterone     Non-alcoholic fatty liver disease     Tubular adenoma of colon 2023    repeat CLN in 2030       CURRENT MEDICATIONS:     Current  Outpatient Medications   Medication Sig Dispense Refill    Glucose Blood (ACCU-CHEK GUIDE) In Vitro Strip Use 1 strip twice daily 200 strip 0    Lancets (ONETOUCH DELICA PLUS [id number]) Does not apply Misc 1 Lancet by Finger stick route 2 (two) times daily. 200 each 0    ALPRAZolam 0.5 MG Oral Tab Take 1 tablet (0.5 mg total) by mouth nightly as needed. 30 tablet 1    Cholestyramine 4 g Oral Powd Pack Take 1 packet by mouth daily. 90 packet 0    CUSTOM MEDICATION Insulin Syringes with needle (0.5 ml/29 gauge/0.5 inches)  Use 1 syringe/needle daily for 30 days   Disp: 30 or 1 package 30 each 2    CUSTOM MEDICATION 22 gauge 1.5 inch needles   Use 1 needle every 5 days 30 each 2    testosterone cypionate 200 mg/mL Intramuscular Solution Inject 0.35 ml (70 mg) into the muscle every 5 days. 6 mL 0    clotrimazole 1 % External Cream Apply 1 Application topically 2 (two) times daily. 28 g 0    FLUoxetine 20 MG Oral Cap Take 1 capsule (20 mg total) by mouth daily. (Patient not taking: Reported on 1/17/2024)         SOCIAL HISTORY:       Social History     Socioeconomic History    Marital status:    Tobacco Use    Smoking status: Former     Packs/day: 0.00     Years: 0.00     Additional pack years: 0.00     Total pack years: 0.00     Types: Cigarettes    Smokeless tobacco: Never   Vaping Use    Vaping Use: Never used   Substance and Sexual Activity    Alcohol use: Never    Drug use: Yes     Types: Cannabis   Other Topics Concern    Special Diet No       SURGICAL HISTORY:     Past Surgical History:   Procedure Laterality Date    Colonoscopy N/A 2/22/2023    Procedure: COLONOSCOPY;  Surgeon: SPENSER Flores MD;  [facility]: [facility] END[facility]    Cyst removal      Hernia surgery         PHYSICAL EXAM:     Vitals:    01/17/24 1133   BP: 110/60   BP Location: Right arm   Patient Position: Sitting   Cuff Size: adult   Pulse: 80   SpO2: 96%   Weight: 171 lb 6.4 oz (77.7 kg)   Height: 6' (1.829 m)       Physical Exam  Constitutional:        General: He is not in acute distress.     Appearance: Normal appearance. He is not ill-appearing, toxic-appearing or diaphoretic.   HENT:      Head: Normocephalic.   Cardiovascular:      Rate and Rhythm: Normal rate and regular rhythm.      Pulses: Normal pulses.      Heart sounds: Normal heart sounds. No murmur heard.  Pulmonary:      Effort: Pulmonary effort is normal. No respiratory distress.      Breath sounds: Normal breath sounds.   Neurological:      General: No focal deficit present.      Mental Status: He is alert and oriented to person, place, and time.   Psychiatric:         Mood and Affect: Mood normal.         Behavior: Behavior normal.         Thought Content: Thought content normal.         Judgment: Judgment normal.          ASSESSMENT AND PLAN:     Stress and anxiety   Discussed possibly needing to add lexapro low dose 2.5mg and wellbutrin 150 immediate release      He will work on lifestyle returning to clean eating and exercise     Sleep disturbance - patient does better when he is more busy during the day and starts his day earlier. He will continue to work on not having caffeine the second half of the day     Thyroid - on supplement blood work redrawn    Cholesterol - he is on supplements. Refilled his cholestyramine and will advise after blood work    Low T- Will advise after blood work     Elevated blood sugars - he monitors it with a glucose monitor. CGM will not be covered by insurance       1. Low testosterone  - CUSTOM MEDICATION; Insulin Syringes with needle (0.5 ml/29 gauge/0.5 inches)  Use 1 syringe/needle daily for 30 days   Disp: 30 or 1 package  Dispense: 30 each; Refill: 2  - CUSTOM MEDICATION; 22 gauge 1.5 inch needles   Use 1 needle every 5 days  Dispense: 30 each; Refill: 2  - testosterone cypionate 200 mg/mL Intramuscular Solution; Inject 0.35 ml (70 mg) into the muscle every 5 days.  Dispense: 6 mL; Refill: 0    2. High cholesterol  - Cholestyramine 4 g Oral Powd Pack;  Take 1 packet by mouth daily.  Dispense: 90 packet; Refill: 0    3. Anxiety  - ALPRAZolam 0.5 MG Oral Tab; Take 1 tablet (0.5 mg total) by mouth nightly as needed.  Dispense: 30 tablet; Refill: 1    4. Elevated fasting blood sugar  - Glucose Blood (ACCU-CHEK GUIDE) In Vitro Strip; Use 1 strip twice daily  Dispense: 200 strip; Refill: 0  - Lancets (ONETOUCH DELICA PLUS [id number]) Does not apply Misc; 1 Lancet by Finger stick route 2 (two) times daily.  Dispense: 200 each; Refill: 0      Time spent with patient: Over 45 minutes spent in chart review and in direct communication with patient obtaining and reviewing history, creating a unique care plan, explaining the rationale for treatment, reviewing potential SE and overall treatment plan,  documenting all clinical information in Epic. Over 50% of this time was in education, counseling and coordination of care.     Problem List Items Addressed This Visit    None  Visit Diagnoses       Low testosterone    -  Primary    Relevant Medications    Cholestyramine 4 g Oral Powd Pack    CUSTOM MEDICATION    CUSTOM MEDICATION    testosterone cypionate 200 mg/mL Intramuscular Solution    High cholesterol        Relevant Medications    Cholestyramine 4 g Oral Powd Pack    Anxiety        Relevant Medications    ALPRAZolam 0.5 MG Oral Tab    Elevated fasting blood sugar        Relevant Medications    Glucose Blood (ACCU-CHEK GUIDE) In Vitro Strip    Lancets (ONETOUCH DELICA PLUS [id number]) Does not apply Misc    Cholestyramine 4 g Oral Powd Pack             Orders Placed This Visit:  No orders of the defined types were placed in this encounter.    No orders of the defined types were placed in this encounter.      Patient Instructions   Get blood work drawn fasting. We will touch base once all the labs are in. Labs could take 2 weeks to return.         No follow-ups on file.    Patient affirmed understanding of plan and all questions were answered.     Shanell Juárez PA-C

## 2024-01-17 NOTE — PATIENT INSTRUCTIONS
Get blood work drawn fasting. We will touch base once all the labs are in. Labs could take 2 weeks to return.

## 2024-01-26 ENCOUNTER — APPOINTMENT (OUTPATIENT)
Dept: CT IMAGING | Age: 49
End: 2024-01-26
Attending: FAMILY MEDICINE

## 2024-01-30 ENCOUNTER — APPOINTMENT (OUTPATIENT)
Dept: CT IMAGING | Age: 49
End: 2024-01-30
Attending: FAMILY MEDICINE

## 2024-01-30 DIAGNOSIS — E78.5 HYPERLIPIDEMIA, UNSPECIFIED: ICD-10-CM

## 2024-01-30 PROCEDURE — 75571 CT HRT W/O DYE W/CA TEST: CPT | Performed by: INTERNAL MEDICINE

## 2024-01-31 ENCOUNTER — HOSPITAL ENCOUNTER (OUTPATIENT)
Age: 49
Discharge: HOME | End: 2024-01-31
Attending: PHYSICIAN ASSISTANT
Payer: COMMERCIAL

## 2024-01-31 ENCOUNTER — APPOINTMENT (OUTPATIENT)
Dept: CT IMAGING | Facility: HOSPITAL | Age: 49
End: 2024-01-31
Attending: PHYSICIAN ASSISTANT
Payer: COMMERCIAL

## 2024-01-31 VITALS
HEART RATE: 67 BPM | OXYGEN SATURATION: 100 % | DIASTOLIC BLOOD PRESSURE: 93 MMHG | TEMPERATURE: 99 F | SYSTOLIC BLOOD PRESSURE: 126 MMHG | RESPIRATION RATE: 20 BRPM

## 2024-01-31 DIAGNOSIS — M54.41 ACUTE RIGHT-SIDED LOW BACK PAIN WITH RIGHT-SIDED SCIATICA: Primary | ICD-10-CM

## 2024-01-31 DIAGNOSIS — R10.31 RIGHT INGUINAL PAIN: ICD-10-CM

## 2024-01-31 DIAGNOSIS — Z87.19 HISTORY OF HERNIA REPAIR: ICD-10-CM

## 2024-01-31 DIAGNOSIS — Z98.890 HISTORY OF HERNIA REPAIR: ICD-10-CM

## 2024-01-31 LAB
#MXD IC: 0.7 X10ˆ3/UL (ref 0.1–1)
BILIRUB UR QL STRIP: NEGATIVE
BUN BLD-MCNC: 15 MG/DL (ref 7–18)
CHLORIDE BLD-SCNC: 103 MMOL/L (ref 98–112)
CLARITY UR: CLEAR
CO2 BLD-SCNC: 25 MMOL/L (ref 21–32)
COLOR UR: YELLOW
CREAT BLD-MCNC: 1.1 MG/DL
EGFRCR SERPLBLD CKD-EPI 2021: 82 ML/MIN/1.73M2 (ref 60–?)
GLUCOSE BLD-MCNC: 99 MG/DL (ref 70–99)
GLUCOSE UR STRIP-MCNC: NEGATIVE MG/DL
HCT VFR BLD AUTO: 49.1 %
HCT VFR BLD CALC: 53 %
HGB BLD-MCNC: 16.7 G/DL
ISTAT IONIZED CALCIUM FOR CHEM 8: 1.16 MMOL/L (ref 1.12–1.32)
KETONES UR STRIP-MCNC: NEGATIVE MG/DL
LEUKOCYTE ESTERASE UR QL STRIP: NEGATIVE
LYMPHOCYTES # BLD AUTO: 1.8 X10ˆ3/UL (ref 1–4)
LYMPHOCYTES NFR BLD AUTO: 27.5 %
MCH RBC QN AUTO: 29.8 PG (ref 26–34)
MCHC RBC AUTO-ENTMCNC: 34 G/DL (ref 31–37)
MCV RBC AUTO: 87.7 FL (ref 80–100)
MIXED CELL %: 10.2 %
NEUTROPHILS # BLD AUTO: 4.2 X10ˆ3/UL (ref 1.5–7.7)
NEUTROPHILS NFR BLD AUTO: 62.3 %
NITRITE UR QL STRIP: NEGATIVE
PH UR STRIP: 6 [PH]
PLATELET # BLD AUTO: 255 X10ˆ3/UL (ref 150–450)
POTASSIUM BLD-SCNC: 4.4 MMOL/L (ref 3.6–5.1)
PROT UR STRIP-MCNC: NEGATIVE MG/DL
RBC # BLD AUTO: 5.6 X10ˆ6/UL
SODIUM BLD-SCNC: 140 MMOL/L (ref 136–145)
SP GR UR STRIP: 1.02
UROBILINOGEN UR STRIP-ACNC: <2 MG/DL
WBC # BLD AUTO: 6.7 X10ˆ3/UL (ref 4–11)

## 2024-01-31 PROCEDURE — 74177 CT ABD & PELVIS W/CONTRAST: CPT | Performed by: PHYSICIAN ASSISTANT

## 2024-01-31 PROCEDURE — 81002 URINALYSIS NONAUTO W/O SCOPE: CPT | Performed by: PHYSICIAN ASSISTANT

## 2024-01-31 PROCEDURE — 96372 THER/PROPH/DIAG INJ SC/IM: CPT | Performed by: PHYSICIAN ASSISTANT

## 2024-01-31 PROCEDURE — 99214 OFFICE O/P EST MOD 30 MIN: CPT | Performed by: PHYSICIAN ASSISTANT

## 2024-01-31 PROCEDURE — 85025 COMPLETE CBC W/AUTO DIFF WBC: CPT | Performed by: PHYSICIAN ASSISTANT

## 2024-01-31 PROCEDURE — 80047 BASIC METABLC PNL IONIZED CA: CPT | Performed by: PHYSICIAN ASSISTANT

## 2024-01-31 RX ORDER — METHYLPREDNISOLONE 4 MG/1
TABLET ORAL
Qty: 1 EACH | Refills: 0 | Status: SHIPPED | OUTPATIENT
Start: 2024-01-31

## 2024-01-31 RX ORDER — KETOROLAC TROMETHAMINE 30 MG/ML
30 INJECTION, SOLUTION INTRAMUSCULAR; INTRAVENOUS ONCE
Status: COMPLETED | OUTPATIENT
Start: 2024-01-31 | End: 2024-01-31

## 2024-01-31 NOTE — ED PROVIDER NOTES
Patient Seen in: [facility]      History     Chief Complaint   Patient presents with    Groin Pain    Sciatica     Stated Complaint: Abdominal Pain    Subjective:   HPI    Patient is a 48-year-old male with diet-controlled diabetes, hyperlipidemia, Guillain-Barré, hepatomegaly, hepatic hemangioma, sciatica, right inguinal hernia repair, presenting to immediate care for several concerns.  Concern #1: Right inguinal pain and swelling.  Onset: Yesterday.  Pain feels similar when had inguinal hernia.  Would like CT for further evaluation.  No fevers.  No nausea or vomiting.  No flank or pelvic or abdominal pain.  No urinary symptoms.  No dysuria hematuria.  Concern #2: Right low back pain with sciatica.  Onset: yesterday.  Symptoms occurred yesterday while holding 2-year-old son.  States he twisted and someone pulled back.  Felt pulling sensation on low back and now having sciatica flare.  Pain radiating down right buttock/leg.  Has not take anything for pain.  No bladder/bowel dysfunction.  No saddle esthesia.  No gait dysfunction.  The symptoms now caused also right inguinal pain and swelling.  Concern for possible complication    Objective:   Past Medical History:   Diagnosis Date    Anxiety     Diabetes (HCC)     Diet-controlled diabetes mellitus (HCC) 08/03/2023    Guillain-[place] (HCC)     High cholesterol     Hyperlipidemia     Low testosterone     Non-alcoholic fatty liver disease     Tubular adenoma of colon 2023    repeat CLN in 2030              Past Surgical History:   Procedure Laterality Date    COLONOSCOPY N/A 2/22/2023    Procedure: COLONOSCOPY;  Surgeon: SPENSER Flores MD;  Location: [facility] ENDO    CYST REMOVAL      HERNIA SURGERY                  No pertinent social history.            Review of Systems   Constitutional:  Negative for fever.   Gastrointestinal:  Negative for abdominal pain, nausea and vomiting.        Right groin pain   Genitourinary:  Negative for difficulty urinating,  dysuria, penile discharge, penile pain, penile swelling and scrotal swelling.   Musculoskeletal:  Positive for back pain. Negative for gait problem, neck pain and neck stiffness.   Allergic/Immunologic: Negative for immunocompromised state.   Neurological:  Negative for dizziness and light-headedness.   Psychiatric/Behavioral:  Negative for confusion.        Positive for stated complaint: Abdominal Pain  Other systems are as noted in HPI.  Constitutional and vital signs reviewed.      All other systems reviewed and negative except as noted above.    Physical Exam     ED Triage Vitals [01/31/24 1006]   BP (!) 126/93   Pulse 67   Resp 20   Temp 98.6 °F (37 °C)   Temp src Temporal   SpO2 100 %   O2 Device None (Room air)       Current:BP (!) 126/93   Pulse 67   Temp 98.6 °F (37 °C) (Temporal)   Resp 20   SpO2 100%         Physical Exam  Vitals and nursing note reviewed.   Constitutional:       General: He is not in acute distress.     Appearance: Normal appearance. He is not ill-appearing, toxic-appearing or diaphoretic.   HENT:      Head: Normocephalic and atraumatic.      Mouth/Throat:      Mouth: Mucous membranes are moist.   Eyes:      Conjunctiva/sclera: Conjunctivae normal.   Cardiovascular:      Rate and Rhythm: Normal rate and regular rhythm.      Pulses: Normal pulses.   Pulmonary:      Effort: Pulmonary effort is normal. No respiratory distress.   Abdominal:      Palpations: Abdomen is soft.      Tenderness: There is no abdominal tenderness. There is no right CVA tenderness or left CVA tenderness.      Comments: Soft nontender.  No guarding or rebound tenderness.  No flank or CVA tenderness.  No peritoneal signs.  Soft abdomen.   Genitourinary:     Penis: Normal.       Testes: Normal.      Comments: No inguinal or scrotal mass.  Normal  external exam  Musculoskeletal:         General: Tenderness present. No swelling. Normal range of motion.      Cervical back: Normal range of motion. No rigidity.       Comments: Tenderness to palpation right low back with muscle tightness.  No spasms.  No step-offs.  Normal back range of motion.  No rash.  No ecchymosis.  No hematoma.  No flank or CVA tenderness   Neurological:      General: No focal deficit present.      Mental Status: He is alert and oriented to person, place, and time.      Motor: No weakness.      Coordination: Coordination normal.      Gait: Gait normal.   Psychiatric:         Mood and Affect: Mood normal.         Behavior: Behavior normal.             ED Course     Labs Reviewed   [facility] POCT URINALYSIS DIPSTICK - Abnormal; Notable for the following components:       Result Value    Blood, Urine Trace-Intact (*)     All other components within normal limits   POCT ISTAT CHEM8 CARTRIDGE - Normal   POCT CBC         Results for orders placed or performed during the hospital encounter of 01/31/24   POCT CBC    Collection Time: 01/31/24 10:26 AM   Result Value Ref Range    WBC IC 6.7 4.0 - 11.0 x10ˆ3/uL    RBC IC 5.60 4.30 - 5.70 X10ˆ6/uL    HGB IC 16.7 13.0 - 17.5 g/dL    HCT IC 49.1 39.0 - 53.0 %    MCV IC 87.7 80.0 - 100.0 fL    MCH IC 29.8 26.0 - 34.0 pg    MCHC IC 34.0 31.0 - 37.0 g/dL    PLT .0 150.0 - 450.0 X10ˆ3/uL    # Neutrophil 4.2 1.5 - 7.7 X10ˆ3/uL    # Lymphocyte 1.8 1.0 - 4.0 X10ˆ3/uL    # Mixed Cells 0.7 0.1 - 1.0 X10ˆ3/uL    Neutrophil % 62.3 %    Lymphocyte % 27.5 %    Mixed Cell % 10.2 %   POCT ISTAT chem8 cartridge    Collection Time: 01/31/24 10:52 AM   Result Value Ref Range    ISTAT Sodium 140 136 - 145 mmol/L    ISTAT BUN 15 7 - 18 mg/dL    ISTAT Potassium 4.4 3.6 - 5.1 mmol/L    ISTAT Chloride 103 98 - 112 mmol/L    ISTAT Ionized Calcium 1.16 1.12 - 1.32 mmol/L    ISTAT Hematocrit 53 37 - 53 %    ISTAT Glucose 99 70 - 99 mg/dL    ISTAT TCO2 25 21 - 32 mmol/L    ISTAT Creatinine 1.10 0.70 - 1.30 mg/dL    eGFR-Cr 82 >=60 mL/min/1.73m2   POCT Urinalysis Dipstick    Collection Time: 01/31/24 10:55 AM   Result Value Ref Range    Urine Color  Yellow Yellow    Urine Clarity Clear Clear    Specific Gravity, Urine 1.020 1.005 - 1.030    PH, Urine 6.0 5.0 - 8.0    Protein urine Negative Negative mg/dL    Glucose, Urine Negative Negative mg/dL    Ketone, Urine Negative Negative mg/dL    Bilirubin, Urine Negative Negative    Blood, Urine Trace-Intact (A) Negative    Nitrite Urine Negative Negative    Urobilinogen urine <2.0 <2.0 mg/dL    Leukocyte esterase urine Negative Negative       CT ABDOMEN+PELVIS(CONTRAST ONLY)(CPT=74177)   Final Result   PROCEDURE: CT ABDOMEN + PELVIS (CONTRAST ONLY) (CPT=74177)       COMPARISON: None.       INDICATIONS: right inguinal pain, hx of hernia repair       TECHNIQUE: CT images of the abdomen and pelvis were obtained with    non-ionic intravenous contrast material.  Automated exposure control for    dose reduction was used. Adjustment of the mA and/or kV was done based on    the patient's size. Use of iterative    reconstruction technique for dose reduction was used.  Dose information is    transmitted to the ACR (American College of Radiology) NRDR (National    Radiology Data Registry) which includes the Dose Index Registry.       FINDINGS:    LUNG BASES: No focal consolidation.   LIVER: No enlargement, atrophy, abnormal density, or significant focal    lesion.     SPLEEN: No enlargement or focal lesion.     GALLBLADDER: No cholelithiasis.   PANCREAS: No lesion, fluid collection, ductal dilatation, or atrophy.     ADRENALS: No defined mass or abnormal enlargement.     KIDNEYS: Enhance symmetrically without hydronephrosis.   AORTA/VASCULAR:   Abdominal aorta is normal in caliber.   ABDOMINAL NODES: No mass or adenopathy.     BOWEL/MESENTERY:  No dilated loops of bowel or definite abnormal bowel    wall thickening.  The appendix is unremarkable.     ABDOMINAL WALL: Evidence of previous right inguinal herniorrhaphy.   URINARY BLADDER: No focal wall thickening or calculus.     PELVIC NODES: No adenopathy.      PELVIC ORGANS: No  visible mass.  Pelvic organs appropriate for patient    age.     BONES:   Osseous structures are intact.  Small sclerotic focus in the    right iliac bone probably a small bone island.   OTHER: Negative.                     =====   CONCLUSION:        No evidence of acute abnormality in the abdomen or pelvis.               Dictated by (CST): Boo Chávez MD on 1/31/2024 at 12:26 PM        Finalized by (CST): Boo Chávez MD on 1/31/2024 at 12:30 PM                      MDM     Patient is a 48-year-old male with diet-controlled diabetes, hyperlipidemia, Guillain-Barré, hepatomegaly, hepatic hemangioma, sciatica, remote history right inguinal hernia repair, presenting to immediate care for evaluation of acute right low back pain with sciatica groin pain.  Onset: Yesterday.  Patient carrying 2-year-old toddler when twisted and felt a pulling sensation.  Symptoms consistent with acute right low back pain and sciatica.  In addition patient endorsing right inguinal groin pain.  Concerned may be related to recurrent inguinal hernia.  Has had history of inguinal hernia repair with mesh placement.  Patient is alert, cooperative, well-appearing.  Afebrile.  Benign abdominal exam.  Patient without inguinal or scrotal masses negative examination.  Minimally tender right groin/inguinal region.  No signs of acute abdomen or obstruction.  Patient tender to palpation low back consistent with musculoskeletal pain and acute sciatica.  Additional workup for evaluation of inguinal hernia.  Screening unremarkable.  No leukocytosis, no anemia.  Normal renal function.  CT abdomen pelvis with IV contrast negative for acute intra-abdominal process.  No inguinal or hernia masses as clinically questioned.  No signs of strangulation or obstruction or acute abdomen.  Discussed laboratory imaging results with patient.  Patient for acute right low back pain with sciatica.  Tylenol and NSAID therapy as needed for pain.  Medrol Dosepak for  sciatica/radicular symptoms.  Activity as tolerated stretching exercises for right groin strain.  PCP follow-up.  Return precautions.          Medical Decision Making      Disposition and Plan     Clinical Impression:  1. Acute right-sided low back pain with right-sided sciatica    2. Right inguinal pain    3. History of hernia repair         Disposition:  Discharge  1/31/2024 12:46 pm    Follow-up:  Carlos A Hu MD  [address] [place] [address] [id number] [phone]          [facility] [address] [place] [address]  Go to   For CT scan          Medications Prescribed:  Current Discharge Medication List        START taking these medications    Details   methylPREDNISolone (MEDROL) 4 MG Oral Tablet Therapy Pack Dosepack: take as directed  Qty: 1 each, Refills: 0

## 2024-01-31 NOTE — ED INITIAL ASSESSMENT (HPI)
Pt came in due to chronic sciatica pain on right side of lower back radiating down right buttock and right leg. Pt stated for the past 2 days he also started having right side groin pain and feeling a bulge in groin area and is worried about possible hernia. Pt stated he has hx of hernia repair in same area. Pt denies any urinary s/s. Pt has easy non labored respirations.

## 2024-02-01 ENCOUNTER — PATIENT MESSAGE (OUTPATIENT)
Dept: INTEGRATIVE MEDICINE | Facility: CLINIC | Age: 49
End: 2024-02-01

## 2024-02-01 DIAGNOSIS — R93.1 AGATSTON CAC SCORE, >400: Primary | ICD-10-CM

## 2024-02-01 RX ORDER — ROSUVASTATIN CALCIUM 20 MG/1
20 TABLET, COATED ORAL NIGHTLY
Qty: 90 TABLET | Refills: 0 | Status: SHIPPED | OUTPATIENT
Start: 2024-02-01

## 2024-02-01 NOTE — TELEPHONE ENCOUNTER
Pt requesting statin prescription and referral for a cardiologist in [place].     Cardiology referral  for Dr. Israel Whittington pended; authorize if appropriate.     RN was unclear what to include in dx; please provide dx.

## 2024-02-01 NOTE — TELEPHONE ENCOUNTER
From: Rio Johnson  To: Shanell Juárez  Sent: 2/1/2024 10:24 AM CST  Subject: Calcium Scoring Test    Hello,    So I recieved my test results for calcium scoring and its really high. I am wondering what are your thoughts on that.     Thanks.

## 2024-02-05 DIAGNOSIS — R79.89 LOW TESTOSTERONE: ICD-10-CM

## 2024-02-05 RX ORDER — FLUOXETINE HYDROCHLORIDE 20 MG/1
20 CAPSULE ORAL DAILY
Qty: 90 CAPSULE | Refills: 0 | Status: SHIPPED | OUTPATIENT
Start: 2024-02-05

## 2024-02-05 NOTE — TELEPHONE ENCOUNTER
A refill request was received for:  Requested Prescriptions     Pending Prescriptions Disp Refills    FLUoxetine 20 MG Oral Cap  0     Sig: Take 1 capsule (20 mg total) by mouth daily.     Medication is external, please advise if you are taking this over.

## 2024-02-08 ENCOUNTER — TELEPHONE (OUTPATIENT)
Dept: CARDIOLOGY | Age: 49
End: 2024-02-08

## 2024-02-09 ENCOUNTER — TELEPHONE (OUTPATIENT)
Dept: CARDIOLOGY | Age: 49
End: 2024-02-09

## 2024-02-20 ENCOUNTER — MED REC SCAN ONLY (OUTPATIENT)
Dept: FAMILY MEDICINE CLINIC | Facility: CLINIC | Age: 49
End: 2024-02-20

## 2024-03-14 PROBLEM — Z95.820 S/P ANGIOPLASTY WITH STENT: Status: ACTIVE | Noted: 2024-03-14

## 2024-03-19 ENCOUNTER — PATIENT MESSAGE (OUTPATIENT)
Dept: FAMILY MEDICINE CLINIC | Facility: CLINIC | Age: 49
End: 2024-03-19

## 2024-03-19 RX ORDER — NEEDLES, DISPOSABLE 25GX5/8"
NEEDLE, DISPOSABLE MISCELLANEOUS
Qty: 50 EACH | Refills: 0 | Status: SHIPPED | OUTPATIENT
Start: 2024-03-19

## 2024-03-19 NOTE — TELEPHONE ENCOUNTER
From: Rio Johnson  To: Carlos A Hu  Sent: 3/19/2024 11:00 AM CDT  Subject: Syringes     Hi Dr. Hu,    The pharmacy never received the prescription for the needles and syringes. Can you please resend them? Thank you.

## 2024-03-19 NOTE — TELEPHONE ENCOUNTER
S/w [facility] pharmacist.     Per [facility] pharmacist, they need to give patient one needle for drawing up medication and one needle for patient to inject testosterone IM along with syringes.    RN authorized pharmacy to dispense recommended needle/syringes to go with testosterone therapy (one needle to draw up medication and and needle  give to himself IM injection). Pharmacy is agreeable.

## 2024-03-22 ENCOUNTER — PATIENT MESSAGE (OUTPATIENT)
Dept: FAMILY MEDICINE CLINIC | Facility: CLINIC | Age: 49
End: 2024-03-22

## 2024-03-22 DIAGNOSIS — M54.31 SCIATICA OF RIGHT SIDE: Primary | ICD-10-CM

## 2024-05-07 ENCOUNTER — OFFICE VISIT (OUTPATIENT)
Dept: FAMILY MEDICINE CLINIC | Facility: CLINIC | Age: 49
End: 2024-05-07
Payer: COMMERCIAL

## 2024-05-07 VITALS
DIASTOLIC BLOOD PRESSURE: 80 MMHG | BODY MASS INDEX: 23.54 KG/M2 | WEIGHT: 173.81 LBS | TEMPERATURE: 98 F | HEART RATE: 67 BPM | HEIGHT: 72 IN | SYSTOLIC BLOOD PRESSURE: 100 MMHG | OXYGEN SATURATION: 98 %

## 2024-05-07 DIAGNOSIS — R09.82 POSTNASAL DRIP: ICD-10-CM

## 2024-05-07 DIAGNOSIS — R52 BODY ACHES: ICD-10-CM

## 2024-05-07 DIAGNOSIS — R93.1 AGATSTON CAC SCORE, >400: ICD-10-CM

## 2024-05-07 DIAGNOSIS — J02.9 SORE THROAT: Primary | ICD-10-CM

## 2024-05-07 LAB
CONTROL LINE PRESENT WITH A CLEAR BACKGROUND (YES/NO): YES YES/NO
KIT LOT #: 6663 NUMERIC
STREP GRP A CUL-SCR: NEGATIVE

## 2024-05-07 PROCEDURE — 99213 OFFICE O/P EST LOW 20 MIN: CPT | Performed by: STUDENT IN AN ORGANIZED HEALTH CARE EDUCATION/TRAINING PROGRAM

## 2024-05-07 PROCEDURE — 87880 STREP A ASSAY W/OPTIC: CPT | Performed by: STUDENT IN AN ORGANIZED HEALTH CARE EDUCATION/TRAINING PROGRAM

## 2024-05-07 PROCEDURE — 87081 CULTURE SCREEN ONLY: CPT | Performed by: STUDENT IN AN ORGANIZED HEALTH CARE EDUCATION/TRAINING PROGRAM

## 2024-05-07 PROCEDURE — 87637 SARSCOV2&INF A&B&RSV AMP PRB: CPT | Performed by: STUDENT IN AN ORGANIZED HEALTH CARE EDUCATION/TRAINING PROGRAM

## 2024-05-07 RX ORDER — ROSUVASTATIN CALCIUM 40 MG/1
40 TABLET, COATED ORAL
Refills: 0 | OUTPATIENT
Start: 2024-05-07

## 2024-05-07 RX ORDER — ROSUVASTATIN CALCIUM 40 MG/1
40 TABLET, COATED ORAL DAILY
Qty: 90 TABLET | Refills: 0 | Status: SHIPPED | OUTPATIENT
Start: 2024-05-07

## 2024-05-07 RX ORDER — ROSUVASTATIN CALCIUM 20 MG/1
20 TABLET, COATED ORAL NIGHTLY
Qty: 90 TABLET | Refills: 0 | OUTPATIENT
Start: 2024-05-07

## 2024-05-07 NOTE — PROGRESS NOTES
Subjective:   Rio Johnson is a 48 year old male who presents for Strep Throat (IC follow up )     48-year-old male complaining of a sore throat, left ear discomfort and bodyaches.  States he was diagnosed with strep 2 to 4 weeks ago at immediate care treated with amoxicillin.  Symptoms feel similar to previous strep infection.  Previously multiple family members had strep such as wife and mother.  Denies fever or chills at this time.    Separately seeing a chiropractor to help with bilateral lower extremity joint aches and pains.    History/Other:    Chief Complaint Reviewed and Verified  Nursing Notes Reviewed and   Verified  Tobacco Reviewed  Allergies Reviewed  Medications Reviewed    Medical History Reviewed  Surgical History Reviewed  Family History   Reviewed  Social History Reviewed         Tobacco:  He smoked tobacco in the past but quit greater than 12 months ago.  Social History     Tobacco Use   Smoking Status Former    Current packs/day: 0.00    Types: Cigarettes   Smokeless Tobacco Never        Current Outpatient Medications   Medication Sig Dispense Refill    rosuvastatin 40 MG Oral Tab Take 1 tablet (40 mg total) by mouth daily. 90 tablet 0    Needle, Disp, (BD DISP NEEDLES) 22G X 1-1/2\" Does not apply Misc 1 needle every five days 50 each 0    Syringe, Disposable, 1 ML Does not apply Misc Use 1 syringe with needle every 5 days. 50 each 0    aspirin 81 MG Oral Tab EC Take 1 tablet (81 mg total) by mouth daily.      clopidogrel 75 MG Oral Tab Take 1 tablet (75 mg total) by mouth.      losartan 25 MG Oral Tab Take 1 tablet (25 mg total) by mouth daily.      metoprolol succinate ER 25 MG Oral Tablet 24 Hr Take 1 tablet (25 mg total) by mouth daily.      CUSTOM MEDICATION 22 gauge 1.5 inch needles   Use 1 needle every 5 days 30 each 0    CUSTOM MEDICATION Syringes with needle (0.5 or 1ml)  Use 1 syringe with needle every 5 days. 30 each 0    hydrOXYzine 25 MG Oral Tab Take 1 tablet  (25 mg total) by mouth nightly as needed for Anxiety (sleep). 90 tablet 0    Glucose Blood (ACCU-CHEK GUIDE) In Vitro Strip Use 1 strip twice daily 200 strip 0    ALPRAZolam 0.5 MG Oral Tab Take 1 tablet (0.5 mg total) by mouth nightly as needed. 30 tablet 1    testosterone cypionate 200 mg/mL Intramuscular Solution Inject 0.35 ml (70 mg) into the muscle every 5 days. 6 mL 0    clotrimazole 1 % External Cream Apply 1 Application topically 2 (two) times daily. 28 g 0         Review of Systems:  Review of Systems   Constitutional:  Negative for chills, diaphoresis and fever.   HENT:  Positive for sore throat. Negative for congestion, ear discharge, ear pain, sinus pressure and sinus pain.    Eyes:  Negative for pain and discharge.   Respiratory:  Negative for cough, chest tightness, shortness of breath and wheezing.    Cardiovascular:  Negative for chest pain and palpitations.   Gastrointestinal:  Negative for abdominal pain, diarrhea, nausea and vomiting.   Endocrine: Negative for cold intolerance and heat intolerance.   Genitourinary:  Negative for dysuria, flank pain, frequency and urgency.   Musculoskeletal:  Negative for joint swelling.        Achiness.   Skin:  Negative for rash.   Neurological:  Negative for dizziness, syncope and headaches.   Psychiatric/Behavioral:  Negative for confusion and hallucinations.        Objective:   /80   Pulse 67   Temp 98 °F (36.7 °C)   Ht 6' (1.829 m)   Wt 173 lb 12.8 oz (78.8 kg)   SpO2 98%   BMI 23.57 kg/m²  Estimated body mass index is 23.57 kg/m² as calculated from the following:    Height as of this encounter: 6' (1.829 m).    Weight as of this encounter: 173 lb 12.8 oz (78.8 kg).  Physical Exam  Constitutional:       General: He is not in acute distress.     Appearance: Normal appearance. He is not ill-appearing or toxic-appearing.   HENT:      Head: Normocephalic and atraumatic.      Right Ear: Tympanic membrane and ear canal normal.      Left Ear: Tympanic  membrane and ear canal normal.      Mouth/Throat:      Mouth: Mucous membranes are moist.      Pharynx: Oropharynx is clear. Posterior oropharyngeal erythema present. No oropharyngeal exudate.      Comments: Postnasal drip noted.  Cardiovascular:      Rate and Rhythm: Normal rate and regular rhythm.      Heart sounds: Normal heart sounds. No murmur heard.     No gallop.   Pulmonary:      Effort: Pulmonary effort is normal. No respiratory distress.      Breath sounds: Normal breath sounds. No stridor. No wheezing, rhonchi or rales.   Abdominal:      General: Bowel sounds are normal.      Palpations: Abdomen is soft.      Tenderness: There is no abdominal tenderness. There is no guarding.   Musculoskeletal:         General: No swelling.      Cervical back: Normal range of motion and neck supple. No rigidity or tenderness.   Skin:     General: Skin is warm and dry.   Neurological:      General: No focal deficit present.      Mental Status: He is alert and oriented to person, place, and time. Mental status is at baseline.   Psychiatric:         Mood and Affect: Mood normal.         Behavior: Behavior normal.         Thought Content: Thought content normal.         Judgment: Judgment normal.         Assessment & Plan:        Recent Results (from the past 72 hour(s))   Strep A Assay W/Optic    Collection Time: 05/07/24  1:05 PM   Result Value Ref Range    Strep Grp A Screen negative Negative    Control Line Present with a clear background (yes/no) yes Yes/No    Kit Lot # [id number] Numeric    Kit Expiration Date 03/22/2025 Date       1. Sore throat (Primary)  -Over-the-counter oral analgesics, including acetaminophen. Oral analgesics act systemically; thus, they will address concomitant symptoms that may accompany sore throat, such as fever or headache.  -Topical treatments applied locally to the throat via lozenge or spray, or via beverages or foods (eg, ice, tea, soup, and honey).  -     Strep A Assay W/Optic  -     Grp A  Strep Cult, Throat; Future; Expected date: 05/07/2024  2. Body aches  Achiness may be due to acute illness or statin.  If continues outside of acute illness to follow-up with cardiology.  -     SARS-COV-22-ALINITY; Future; Expected date: 05/07/2024  3. Postnasal drip  -Mechanical irrigation with saline. Intranasal saline spray may also be used  -Intranasal glucocorticoids.          Return if symptoms worsen or fail to improve.    Carlos A Hu MD, 5/7/2024, 12:42 PM

## 2024-05-07 NOTE — TELEPHONE ENCOUNTER
A refill request was received for:  Requested Prescriptions     Pending Prescriptions Disp Refills    rosuvastatin 40 MG Oral Tab  0     Sig: Take 1 tablet (40 mg total) by mouth.     Last refill date:  03/14/2024  Qty: N/A  Dx:   Diet-controlled diabetes mellitus   Last office visit: 05/07/2024   When is follow up due: N/A     Note is copied form patient message today    Patient comment: Hi doc. [facility] just called me and said you denied rhis refill. Can you please send it theough? Thank you.       No future appointments.

## 2024-05-08 ENCOUNTER — PATIENT MESSAGE (OUTPATIENT)
Dept: FAMILY MEDICINE CLINIC | Facility: CLINIC | Age: 49
End: 2024-05-08

## 2024-05-08 LAB
FLUAV + FLUBV RNA SPEC NAA+PROBE: NOT DETECTED
FLUAV + FLUBV RNA SPEC NAA+PROBE: NOT DETECTED
RSV RNA SPEC NAA+PROBE: NOT DETECTED
SARS-COV-2 RNA RESP QL NAA+PROBE: NOT DETECTED

## 2024-05-09 ENCOUNTER — NURSE TRIAGE (OUTPATIENT)
Dept: FAMILY MEDICINE CLINIC | Facility: CLINIC | Age: 49
End: 2024-05-09

## 2024-05-09 DIAGNOSIS — Z01.82 ENCOUNTER FOR ALLERGY TESTING: Primary | ICD-10-CM

## 2024-05-09 NOTE — TELEPHONE ENCOUNTER
RN s/w patient    Patient stated he has a sore throat and ear ache for 3 days since he last saw Dr Hu. Patient denies any fevers or any other symptoms. Throat culture still pending.    Patient asking about allergy testing.     Forwarded to MD for recommendations.

## 2024-05-09 NOTE — TELEPHONE ENCOUNTER
From: Rio Johnson  To: Carlos A Hu  Sent: 5/8/2024 5:28 PM CDT  Subject: Test results follow up.     Chas Ramirez     I was wondering if there is a h additional testing we can do. My throat is still very irritated and ears. Perhaps we can do the allergy testing as well? Or some other testing?     Thank you

## 2024-05-16 ENCOUNTER — PATIENT OUTREACH (OUTPATIENT)
Dept: CASE MANAGEMENT | Age: 49
End: 2024-05-16

## 2024-05-16 NOTE — PROCEDURES
The office order for Diabetes registry removal request is Approved and finalized on May 16, 2024.    Thanks,  [facility] Team

## 2024-05-22 DIAGNOSIS — R79.89 LOW TESTOSTERONE: ICD-10-CM

## 2024-05-22 NOTE — TELEPHONE ENCOUNTER
A refill request was received for:  Requested Prescriptions     Pending Prescriptions Disp Refills    testosterone cypionate 200 mg/mL Intramuscular Solution 6 mL 0     Sig: Inject 0.35 ml (70 mg) into the muscle every 5 days.     Last refill date:  1/17/24   Qty: 6 ml and 0   Dx: BHRt   Last office visit:  1/17/24   When is follow up due:  now        Future Appointments   Date Time Provider Department Center   5/24/2024  1:00 PM Shanell Juárez, [facility]

## 2024-05-23 ENCOUNTER — LAB ENCOUNTER (OUTPATIENT)
Dept: LAB | Facility: REFERENCE LAB | Age: 49
End: 2024-05-23
Attending: STUDENT IN AN ORGANIZED HEALTH CARE EDUCATION/TRAINING PROGRAM

## 2024-05-23 DIAGNOSIS — Z01.82 ENCOUNTER FOR ALLERGY TESTING: ICD-10-CM

## 2024-05-23 DIAGNOSIS — E34.8 ENDOCRINE AXIS DYSFUNCTION: ICD-10-CM

## 2024-05-23 DIAGNOSIS — R73.01 ELEVATED FASTING BLOOD SUGAR: ICD-10-CM

## 2024-05-23 DIAGNOSIS — E78.5 HYPERLIPIDEMIA, UNSPECIFIED HYPERLIPIDEMIA TYPE: ICD-10-CM

## 2024-05-23 DIAGNOSIS — F41.9 ANXIETY: ICD-10-CM

## 2024-05-23 DIAGNOSIS — F43.0 STRESS REACTION: ICD-10-CM

## 2024-05-23 DIAGNOSIS — R74.8 ELEVATED LIVER ENZYMES: ICD-10-CM

## 2024-05-23 DIAGNOSIS — G61.0 AIDP (ACUTE INFLAMMATORY DEMYELINATING POLYNEUROPATHY) (HCC): ICD-10-CM

## 2024-05-23 DIAGNOSIS — R79.89 ELEVATED HOMOCYSTEINE: ICD-10-CM

## 2024-05-23 DIAGNOSIS — I89.0 LYMPHEDEMA: ICD-10-CM

## 2024-05-23 DIAGNOSIS — R53.82 CHRONIC FATIGUE: ICD-10-CM

## 2024-05-23 LAB
ALBUMIN SERPL-MCNC: 4.2 G/DL (ref 3.2–4.8)
ALBUMIN/GLOB SERPL: 1.7 {RATIO} (ref 1–2)
ALP LIVER SERPL-CCNC: 55 U/L
ALT SERPL-CCNC: 38 U/L
ANION GAP SERPL CALC-SCNC: 6 MMOL/L (ref 0–18)
AST SERPL-CCNC: 25 U/L (ref ?–34)
BASOPHILS # BLD AUTO: 0.07 X10(3) UL (ref 0–0.2)
BASOPHILS NFR BLD AUTO: 1.1 %
BILIRUB SERPL-MCNC: 0.6 MG/DL (ref 0.3–1.2)
BUN BLD-MCNC: 19 MG/DL (ref 9–23)
BUN/CREAT SERPL: 18.1 (ref 10–20)
CALCIUM BLD-MCNC: 9.2 MG/DL (ref 8.7–10.4)
CHLORIDE SERPL-SCNC: 108 MMOL/L (ref 98–112)
CHOLEST SERPL-MCNC: 158 MG/DL (ref ?–200)
CO2 SERPL-SCNC: 28 MMOL/L (ref 21–32)
COMPLEXED PSA SERPL-MCNC: 1.03 NG/ML (ref ?–4)
CORTIS SERPL-MCNC: 17.2 UG/DL
CREAT BLD-MCNC: 1.05 MG/DL
CRP SERPL-MCNC: <0.4 MG/DL (ref ?–1)
DEPRECATED RDW RBC AUTO: 37.2 FL (ref 35.1–46.3)
DHEA-S SERPL-MCNC: 79.5 UG/DL
EGFRCR SERPLBLD CKD-EPI 2021: 87 ML/MIN/1.73M2 (ref 60–?)
EOSINOPHIL # BLD AUTO: 0.17 X10(3) UL (ref 0–0.7)
EOSINOPHIL NFR BLD AUTO: 2.8 %
ERYTHROCYTE [DISTWIDTH] IN BLOOD BY AUTOMATED COUNT: 11.8 % (ref 11–15)
EST. AVERAGE GLUCOSE BLD GHB EST-MCNC: 111 MG/DL (ref 68–126)
ESTRADIOL SERPL-MCNC: 45.8 PG/ML
FASTING PATIENT LIPID ANSWER: YES
FASTING STATUS PATIENT QL REPORTED: YES
FSH SERPL-ACNC: <0.3 MIU/ML
GGT SERPL-CCNC: 26 U/L
GLOBULIN PLAS-MCNC: 2.5 G/DL (ref 2–3.5)
GLUCOSE BLD-MCNC: 97 MG/DL (ref 70–99)
HBA1C MFR BLD: 5.5 % (ref ?–5.7)
HCT VFR BLD AUTO: 47.7 %
HCYS SERPL-SCNC: 10.9 UMOL/L (ref 3.7–13.9)
HDLC SERPL-MCNC: 38 MG/DL (ref 40–59)
HGB BLD-MCNC: 16.2 G/DL
IMM GRANULOCYTES # BLD AUTO: 0.06 X10(3) UL (ref 0–1)
IMM GRANULOCYTES NFR BLD: 1 %
LDLC SERPL CALC-MCNC: 87 MG/DL (ref ?–100)
LYMPHOCYTES # BLD AUTO: 1.59 X10(3) UL (ref 1–4)
LYMPHOCYTES NFR BLD AUTO: 26 %
MCH RBC QN AUTO: 29.3 PG (ref 26–34)
MCHC RBC AUTO-ENTMCNC: 34 G/DL (ref 31–37)
MCV RBC AUTO: 86.4 FL
MONOCYTES # BLD AUTO: 0.57 X10(3) UL (ref 0.1–1)
MONOCYTES NFR BLD AUTO: 9.3 %
NEUTROPHILS # BLD AUTO: 3.65 X10 (3) UL (ref 1.5–7.7)
NEUTROPHILS # BLD AUTO: 3.65 X10(3) UL (ref 1.5–7.7)
NEUTROPHILS NFR BLD AUTO: 59.8 %
NONHDLC SERPL-MCNC: 120 MG/DL (ref ?–130)
OSMOLALITY SERPL CALC.SUM OF ELEC: 296 MOSM/KG (ref 275–295)
PLATELET # BLD AUTO: 266 10(3)UL (ref 150–450)
POTASSIUM SERPL-SCNC: 4.3 MMOL/L (ref 3.5–5.1)
PROLACTIN SERPL-MCNC: 6.6 NG/ML
PROT SERPL-MCNC: 6.7 G/DL (ref 5.7–8.2)
RBC # BLD AUTO: 5.52 X10(6)UL
SODIUM SERPL-SCNC: 142 MMOL/L (ref 136–145)
T3FREE SERPL-MCNC: 3.02 PG/ML (ref 2.4–4.2)
T4 FREE SERPL-MCNC: 1.4 NG/DL (ref 0.8–1.7)
TRIGL SERPL-MCNC: 190 MG/DL (ref 30–149)
TSI SER-ACNC: 1.14 MIU/ML (ref 0.55–4.78)
VIT D+METAB SERPL-MCNC: 40.1 NG/ML (ref 30–100)
VLDLC SERPL CALC-MCNC: 31 MG/DL (ref 0–30)
WBC # BLD AUTO: 6.1 X10(3) UL (ref 4–11)

## 2024-05-23 PROCEDURE — 84481 FREE ASSAY (FT-3): CPT

## 2024-05-23 PROCEDURE — 82785 ASSAY OF IGE: CPT

## 2024-05-23 PROCEDURE — 82977 ASSAY OF GGT: CPT

## 2024-05-23 PROCEDURE — 84439 ASSAY OF FREE THYROXINE: CPT | Performed by: FAMILY MEDICINE

## 2024-05-23 PROCEDURE — 82533 TOTAL CORTISOL: CPT

## 2024-05-23 PROCEDURE — 84443 ASSAY THYROID STIM HORMONE: CPT | Performed by: FAMILY MEDICINE

## 2024-05-23 PROCEDURE — 82670 ASSAY OF TOTAL ESTRADIOL: CPT | Performed by: FAMILY MEDICINE

## 2024-05-23 PROCEDURE — 83036 HEMOGLOBIN GLYCOSYLATED A1C: CPT

## 2024-05-23 PROCEDURE — 86003 ALLG SPEC IGE CRUDE XTRC EA: CPT

## 2024-05-23 PROCEDURE — 83001 ASSAY OF GONADOTROPIN (FSH): CPT

## 2024-05-23 PROCEDURE — 36415 COLL VENOUS BLD VENIPUNCTURE: CPT

## 2024-05-23 PROCEDURE — 84410 TESTOSTERONE BIOAVAILABLE: CPT

## 2024-05-23 PROCEDURE — 86140 C-REACTIVE PROTEIN: CPT

## 2024-05-23 PROCEDURE — 80061 LIPID PANEL: CPT | Performed by: FAMILY MEDICINE

## 2024-05-23 PROCEDURE — 85025 COMPLETE CBC W/AUTO DIFF WBC: CPT | Performed by: FAMILY MEDICINE

## 2024-05-23 PROCEDURE — 84146 ASSAY OF PROLACTIN: CPT

## 2024-05-23 PROCEDURE — 82627 DEHYDROEPIANDROSTERONE: CPT

## 2024-05-23 PROCEDURE — 80053 COMPREHEN METABOLIC PANEL: CPT

## 2024-05-23 PROCEDURE — 82306 VITAMIN D 25 HYDROXY: CPT | Performed by: FAMILY MEDICINE

## 2024-05-23 PROCEDURE — 83090 ASSAY OF HOMOCYSTEINE: CPT

## 2024-05-23 RX ORDER — TESTOSTERONE CYPIONATE 200 MG/ML
INJECTION, SOLUTION INTRAMUSCULAR
Qty: 6 ML | Refills: 0 | Status: SHIPPED | OUTPATIENT
Start: 2024-05-23 | End: 2024-08-09

## 2024-05-24 ENCOUNTER — OFFICE VISIT (OUTPATIENT)
Dept: INTEGRATIVE MEDICINE | Facility: CLINIC | Age: 49
End: 2024-05-24

## 2024-05-24 VITALS
OXYGEN SATURATION: 98 % | HEIGHT: 72 IN | HEART RATE: 64 BPM | DIASTOLIC BLOOD PRESSURE: 80 MMHG | SYSTOLIC BLOOD PRESSURE: 120 MMHG | WEIGHT: 172.63 LBS | BODY MASS INDEX: 23.38 KG/M2

## 2024-05-24 DIAGNOSIS — R79.89 ELEVATED HOMOCYSTEINE: Primary | ICD-10-CM

## 2024-05-24 DIAGNOSIS — E78.00 HIGH CHOLESTEROL: ICD-10-CM

## 2024-05-24 DIAGNOSIS — R79.89 LOW TESTOSTERONE: ICD-10-CM

## 2024-05-24 DIAGNOSIS — Z76.89 SLEEP CONCERN: ICD-10-CM

## 2024-05-24 DIAGNOSIS — R53.82 CHRONIC FATIGUE: ICD-10-CM

## 2024-05-24 LAB

## 2024-05-24 PROCEDURE — G2211 COMPLEX E/M VISIT ADD ON: HCPCS | Performed by: PHYSICIAN ASSISTANT

## 2024-05-24 PROCEDURE — 99215 OFFICE O/P EST HI 40 MIN: CPT | Performed by: PHYSICIAN ASSISTANT

## 2024-05-24 RX ORDER — MODAFINIL 100 MG/1
100 TABLET ORAL DAILY
Qty: 30 TABLET | Refills: 0 | Status: SHIPPED | OUTPATIENT
Start: 2024-05-24 | End: 2024-06-23

## 2024-05-24 NOTE — PATIENT INSTRUCTIONS
Based on Testosterone we will either do DIM or anastrazaole   Start DIM     Do not take Dhist with zyrtec     For allergies   Shower before bed when outside  Leave clothes in the bathroom     Cortisol -   Look into [facility]   [email or web address]     Sematic yoga     3 meals a day no snacking 12-14 hour fast every day   Protein goal 75- 100g a day Protein with every meal   Fiber goal 35g a day  Net carb goal <30g per meal    Total Carb - fiber = net carb  Increase in good fats - nuts, olive oil avacado     Future anti inflammatory diet     Fruit Carbohydrates per serving Fiber per serving Net carbs   Grapes (1Cup/151g)  26g 0.8g 25.2g   Banana (1 medium) 24g 3.1g 21g   Pear (1 medium) 22g 6g 16g   Apple (1 medium) 21g 4.4g 16.6g         Pineapple (1Cup/165g) 29g 2.3g 26.7   Blueberries (1Cup/148g) 17g 4g 13g   Cantaloupe (1Cup/177g) 14g 1.6g 12.4g   Oranges (1 medium) 12g 2.3g 9.7g   Watermelon (1Cup/154g) 12g 0.6g 11.4g   Peaches (1 Large) 9.5g 3g 6.5   Kiwi (1 medium) 9g 2.1g 6.9   Avocado (half of one) 9.5g 4.6g 4.9g   Strawberries (1Cup/144g) 8g 3 5g   Lime (1 fruit) 7g 1.9g 5.1g   Lemon (1 fruit) 6g 1.6g 4.4g   Tomatoes (1 fruit) 3.2g  1.5g 1.7g   Vegetables Carbohydrates per serving  Fiber per serving  Net carbs per serving    Asparagus (5 brown/93.5g) 4g 1.5g 2.5g   Bell Pepper (1 medium) 6g 2g 4g   Broccoli (1 medium stalk) 8g 6g 2g   Carrot (1 carrot 7.5inches long) 7g 1.7g 5.3g   Cauliflower (99g)  5g 2g 3g   Celery (2 medium stalks) 4g 2g 2g   Cucumber (? medium) 2g 1g 1g   Green Beans (3/4C)  5g 3g 2g   Green Cabbage (84g) 5g 2g 3g   Green Onion (1/4C chopped) 2g 1g 1g   Iceberg lettuce (89g)  2g 1.1g 0.9g   Jalapeno (1C sliced 6g 2.5g 3.5g   Leaf lettuce (1/2C shredded) 2g 1g 1.g   Mushrooms (5 medium) 3g 1g 2g   Onion (1 medium) 11g 1.9g 9g   Potatoe (1 medium/148g) 26g 2g 24g   Radishes (7 radishes) 3g 0.7g 2.3g   Summer Squash (½ medium) 4g 1.1g 2.9g   Sweet corn (1 medium ear) 18g 2.4g 15.6    Sweet Potato (1 medium) 23g 4g 19g           ANTI-INFLAMMATORY LIFESTYLE FOODS    General Diet Recommendations   Eat protein at every meal emphasizing salmon, mackerel, sardines, and herring  Limit red meat consumption to two meals per week  Avoid omega 6 oils that aggravate inflammation   corn oil  soybean oil  safflower oil   sun flower oil  Avoid hydrogenated vegetable oils and fried foods  Avoid white flour and all refined carbohydrates including cereals, pasta, bread, bagels and English muffins. Replace these with brown rice, sweet potato, or steel-cut oats  Eat spices such as shilo, garlic, turmeric, cilantro, rosemary, oregano, and thyme.   Drink plenty of water (enough until urine is clear/faint yellow color and odorless)  Eat 5 servings of fresh vegetables daily   Eat no more than 3 servings of fresh fruit daily    Avoid sugar      Include in abundance   Fish: Lemmon, Mackerel, Sardines, or Herring  Dark Green Leafy Vegetables  Yellow, Orange, and Red Vegetables  Bok marino  Celery  Beets  Broccoli  Blueberries  Pineapple  Walnuts  Coconut Oil  Chris Seeds  Flax Seeds  Turmeric  Shilo  Olive oil   Avoid   Sugar  Artificial Sweeteners  Saturated and Trans fats   Hydrogenated Oils  Canola  Corn Oil  Peanut  Soy  Omega 6 Fatty Acids  Refined Carbohydrates  Processed foods  MSG  Gluten- Breads, Crackers, Pastries, Cereals  Dairy/Casein  Alcohol  Deli Meats       RECIPE IDEAS  Breakfast   ENERGIZING PINEAPPLE SMOOTHIE  Ingredients  1 cup brewed and cooled green tea  2 cups spinach or kale  1 cup frozen pineapple chunks  ? cup cucumber, peeled and cut into large chunks)  ½ cup frozen delmis chunks  ½ of a medium banana, peeled  ½” fresh shilo - peeled and cut from stalk (about ½ tsp)  ¼ tsp ground turmeric  3 mint leaves - rough chopped  1 scoop protein powder  1 Tbsp chris seeds  4-5 ice cubes (or more or less to personal desired consistency)    Directions  1.  Combine all the ingredients, except the chris seeds,  in a high speed .    2.  Add chris seeds at the end of the blending process so they don’t stick to the  container.    3. If you like your smoothie thicker, add ice cubes and blend until desired consistency is met. AVOCADO TOAST WITH EGG  Ingredients  1 slice of gluten-free bread, toasted  1½ tsp ghee  ½ an avocado  Handful of spinach  1 egg, poached or scrambled  Red pepper flakes  This combination is simple to create.    Directions  1.  Toast the gluten-free bread and top with ghee.    2.  Spread the avocado onto the toast. Place fresh spinach leaves on top of the avocado and then top all with a poached or scrambled egg and finish it off with a sprinkle of red pepper flakes.    3. Enjoy open faced with a fork and knife, or add a second piece of toast to make it a sandwich.         CHRIS QUINOA PORRIDGE  Ingredients  1 cup thick cashew milk  2 cups cooked quinoa  1 cup fresh organic blueberries (or frozen)  ¼ cup toasted walnuts  ½ tsp ground cinnamon  2 tsp raw honey  1 Tbsp chris seeds    Directions  1.  Combine the quinoa and cashew milk in a saucepan and slowly warm over medium low heat.    2.  Stir in blueberries, cinnamon and walnuts until all are evenly warmed. Remove from heat and stir in raw honey. Top with chris seeds.    3.  Serve in bowls and top with raw cacao nibs for an added pop of antioxidants.                 http://www.[employer].com/health/centers/rheumatoid_arthritis/articles/famous_chefs_recipes_for_your_antiinflammatory_diet.aspx                    Lunch   LENTIL VEGETABLE SOUP  Ingredients   1 pound Eritrean green lentils, dry   4 cups chopped yellow onions (3 large onions)   4 cups chopped leeks, white part only (2 leeks)   1 tablespoon minced garlic (3 cloves)   1/4 cup good olive oil, plus additional for drizzling on top   1 tablespoon kosher salt   1-½ teaspoons freshly ground black pepper   1 tablespoon minced fresh thyme leaves or 1 teaspoon dried   1 teaspoon ground cumin   3  cups medium-diced celery (8 stalks)   3 cups medium-diced carrots (4-6 carrots)   3 quarts chicken stock   1/4 cup tomato paste   2 tablespoons red wine or red wine vinegar   Freshly grated Parmesan cheese     Directions  1. In a large bowl, cover the lentils with boiling water and let sit for 15 minutes. Drain.     2. In a large stockpot over medium heat, sauté the onions, leeks, and garlic with the olive oil, salt, pepper, thyme and cumin for 20 minutes, until the vegetables are translucent and very tender.    3. Add the celery and carrots and sauté for 10 minutes. Add the chicken stock, tomato paste and lentils. Cover and bring to a boil. Reduce heat and simmer uncovered for 1 hour, until the lentils are cooked through.     4. Check the seasonings. Add the red wine and serve hot, drizzled with olive oil and sprinkled with grated Parmesan.  GRILLED EGGPLANT SALAD SERVES  Ingredients   1 Italian eggplant, cut into 1-inch-thick slices   1 large red onion, cut into rounds Canola oil   1 avocado, halved, pitted, peeled and chopped   1 tablespoon red wine vinegar   1 teaspoon Ayden mustard   1 tablespoon coarsely chopped oregano leaves   Honey   Olive oil  Salt   Freshly ground black pepper   1 lemon, zested     Directions  1. Brush the eggplant and red onions with canola oil and arrange on the grill. Cook the eggplant until soft and grill the onions until they have a slight henry.     2. Remove from the grill to a cutting board and let cool slightly. Once cool, roughly chop and add them to a serving bowl along with the avocado.     3. In a small bowl, whisk together the red wine vinegar, Ayden and oregano.     4. Add honey and olive oil, to taste, and blend until emulsified. Season with salt and pepper, to taste. 5. Add the dressing to the eggplant mixture and toss.                                 WILD RICE PILAF  Ingredients  [facility] Wild Rice  Olive oil  Vegetable stock  1 cup sliced mushrooms  1 minced shallot  ½  cup chopped celery    Directions  1. Follow the cooking instructions on a package of “Gallo Wild Rice Blend” using olive oil instead of butter and vegetable stock instead of water.     2. In a non-stick pan saute 1 cup of sliced mushrooms, 1 minced shallot, ½ cup chopped celery, and 1 clove of minced garlic in 3 tbsp olive oil.     3. When the rice is done, combine with the mushroom mixture and serve.      http://www.[employer]/health/centers/rheumatoid_arthritis/articles/famous_chefs_recipes_for_your_antiinflammatory_diet.aspx  Dinner   ROASTED CHICKEN WITH BALSAMIC VINAIGRETTE  Ingredients   1/4 cup balsamic vinegar   2 tablespoons Ayden mustard   2 tablespoons fresh lemon juice   2 garlic cloves, chopped   2 tablespoons olive oil   Salt   Freshly ground black pepper   1 (4-pound) whole chicken, cut into pieces (reserve giblets, neck and backbone for another use)   1/2 cup low-salt chicken broth   1 teaspoon lemon zest   1 tablespoon chopped fresh parsley leaves     Directions  1. Whisk the vinegar, mustard, lemon juice, garlic, olive oil, salt and pepper in small bowl to blend.     2. Combine the vinaigrette and chicken pieces in a large, resealable plastic bag; seal the bag and toss to coat. Refrigerate, turning the chicken pieces occasionally, for at least 2 hours and up to one day.     3. Preheat the oven to 400 degrees F. Remove chicken from the bag and arrange pieces on a large, greased baking dish.     4. Roast until the chicken is just cooked through, about 1 hour. If your chicken browns too quickly, cover it with foil for the remaining cooking time.    5. Transfer the chicken to a serving platter.    HERBED SALMON  Ingredients  1/2 seedless cucumber   2 small plum tomatoes   1 shallot or 1/4 red onion, finely chopped, divided   2 tablespoons Ayden mustard   2 tablespoons sugar   1/4 cup white wine vinegar   1/2 cup extra-virgin olive oil, plus a drizzle   1/4 cup finely chopped fresh dill    Salt  Freshly ground black pepper 4 (6-ounce) skinless salmon fillets   Seafood seasoning (recommended: [facility])     Directions  1. Dice cucumber into 1/4-inch pieces - to peel or not to peel is up to you. Seed and dice the tomatoes into 1/4-inch pieces. Combine cucumber, tomatoes and half the shallots or red onion in a bowl and set aside.     2. In a small bowl, whisk together mustard, sugar and white wine vinegar and remaining half of the shallots. Stream in extra-virgin olive oil while continuing to whisk. Stir in dill and season dressing with salt and pepper to taste.     3. Season the salmon with seafood seasoning and a little black pepper.     4. Heat a nonstick skillet with a drizzle of extra-virgin olive oil over medium-high heat. Place salmon rounded side down and cook until scruggs and a little crispy at the edges, 3-4 minutes. Flip and cook 2 minutes more for a pink center, or 4 minutes for opaque fish.     5. Transfer salmon to dinner or serving plates, top with the cucumber-tomato relish and cover with a liberal amount of dill dressing.  BLACK FRIED RICE W/ SNAP PEAS  Ingredients  2 tablespoons Organic Extra Virgin Coconut Oil  2 medium carrots, diced  1 small yellow onion, diced  1 bunch scallions, white and green parts , thinly sliced  1 cup thinly sliced snap peas  2 garlic cloves, minced  1 tablespoon minced fresh shilo  3 cups cooked black rice (from 1 cup uncooked)  3 tablespoons Liquid Aminos  2 teaspoons toasted sesame oil  1 teaspoon sriracha  2 eggs, beaten  1 tablespoon Organic Shelled Hemp Seed    Directions  1. In a large wok or nonstick skillet heat the coconut oil. Sauté the carrot, onion, and white scallion over high heat until soft and beginning to brown, about 5 minutes.     2.  Add the snap peas, garlic, shilo, and green scallions and stir-chavez until fragrant, another 2 minutes. Fold in the rice and stir-chavez until well-coated in the vegetable mixture and beginning to toast,  2 minutes.     3.  Add the liquid aminos, sesame oil, and sriracha and stir to combine. Push the rice to the side of the pan to create a well. Pour the eggs into the center and cook, stirring gently, until nearly set.     4.  Toss the fried rice with the eggs and hemp seeds. Transfer the fried rice to bowls and serve right away.            http://[employer]/fried-forbiddenblack-rice-recipe-with-snap-peas-and-scallions/   http://www.[employer].com/health/centers/rheumatoid_arthritis/articles/famous_chefs_recipes_for_your_antiinflammatory_diet.aspx    Baked Goods/Snacks   BANANA NUT MUFFINS  Ingredients  1 cup almond flour  1 cup gluten-free sprouted flour blend (brown rice, oat and sorghum blend)  1 teaspoon cinnamon  ½ teaspoon salt  2 teaspoons baking soda  2 eggs or 2 flax eggs (flax eggs: 1/4 cup water + 2 tablespoons ground flax)  ¼ cup water  ½ cup maple syrup  2 very ripe bananas, mashed  ½ cup walnuts, crushed  TOPPINGS:  1 banana, cut in half and thinly sliced  Crushed walnuts     Directions   1.  Preheat oven to 350 F.    2.  Mix all ingredients in a large bowl.  3.  Fill greased or paper-lined muffin cups 2/3 full.  4. Add banana slice and walnuts and bake for 15-18 minutes.   NO BAKE ALMOND BARS  Ingredients  3/4 cup almond flour  3/4 cup unsweetened finely shredded coconut  3/4 cup equivalent powdered sweetener (Use powdered coconut sugar for Paleo or powdered sweetener)  1 cup + 2 Tbsp almond butter (or any nut butter)  2 Tbsp coconut oil  4 1/2 oz dark chocolate?    Directions  1.  In a large bowl, combine the almond flour, coconut, and sweetener.     2. Over medium-low heat melt 1 cup of almond butter and coconut oil.    3. Once melted, add the almond butter to the dry ingredients and mix well.    4. Press the mixture into a 8\" x 8\" baking dish.    5. Over medium-low heat melt 2 Tbsp of almond butter and the chocolate.    6. Once melted, pour the chocolate over the almond butter mixture and  smooth out the top for even coverage.    7. Refrigerate for 2 hours or until the almond butter mixture has set. To reduce the amount of time for the almond butter mixture to set place the bars in the freezer until set then cut into 12 even bars.   BAKED APPLES  Ingredients  4 medium apples  1 lemon (juice and rind)  1 cinnamon stick  Currants or raisins     Directions  1.  Place 4 medium apples that have been washed and cored in a large glass oven proof bowl (with lid).     2.  Place a strip of lemon rind, ¼ of a whole vanilla bean, 1 cinnamon stick, and some currants or raisins inside each apple. Drizzle apples with ¼ cup lemon juice and scatter remainder of currants around them.     3.  Bake covered at 350 degrees for 60 to 75 minutes. Serve warm or cold, you can also drizzle with almond milk.       https://draxe.com/healthy-snacks/  Anti inflammatory diet will be the best to manage elevated glucose and cholesterol     Take a 1/4 or 1/4 tab of modafinil as needed     Can use rodiola   Can use D hist  With blood thinners and blood med  Monitor and low blood pressure   If you get dizzy when you stand up watch blood pressure

## 2024-05-24 NOTE — PROGRESS NOTES
Rio Johnson is a 48 year old male.  Chief Complaint   Patient presents with    Follow - Up     Patient presents for follow up.        HPI:   Rio presents for follow up,     Updates from last visit:   He had a calcium score that was high. He went to a cardiologist. 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 He had two stents place.     Surgery was March 10th. They started metoprolol and losartan daily. He is on crestor     Thyroid: numbers are looking good with TSH and T4     Body/skin:   He feels he is having low back and leg pain. He started taking crestor every other day.   He follows up on cardiology in July. They will look at removing BP medication     He has more nasal congestion and coughing. He is taking zyrtec that makes him sleeping     Mental State: he stopped prozac and was switched to hydroxyzine as needed. It helps him sleep.     Sleep - Hydroxyzine helps with his sleep. 1-2 times a week he needs medication.    Marriage is hard  Child is 2.5   Pressure with work and school. When he feels like he needs to study and cannot he will get more stressed        HPI's FROM PREVIOUS VISITS    Rio presents for follow up,     He has been on cholestryramine for cholesterol in the past. He was having a hard time tolerating it. He is now taking supplements     For stress he has tried Wellbutrin     Hormones - He is on testosterone 0.35ml every 5 days. No libido or erection issues. He was on hormones for IVF.     GI - no concerns       Lifestyle Factors affecting health:   Diet - He tried to do low carb, low sugar intermittent fasting. He will eat sour dough bread. He has fallen off for the holidays and trying to get back on. He will have high good fats. Avoids seed oils. He drops weight quickly and will get groggy.     Morning - pink sea salt and water  Hummus, cheese sourdough bread.     Exercise -He gos through phases. Where he is going to the gym a few times a week.      Stress - work and  marriage stress. He has a young child. Stress and anxiety worsens his sleep. Working out and avoiding stimulants help.     He is taking business classes    He is struggling with [place] is happening in [place]     Sleep - He has a racing mind when he tries to sleep. He takes magnesium glycinate and glycine at night and l theanine.   When he has a racing mind he will take xanax. He will do that once a week max if he is not getting sleep.     He can fall asleep when he is really tired but then he wakes up 4 hours later.     He had Guillain-Barré syndrome and has residual fatigue and pain. He burns out fast.   He will take a amino acid drink and pre work out to help physical and mental boost.       HPI:     49 yo present for follow up.     Off Bupropion for 2 months and notes overall doing well. Felt to many side effects with increased increased.  Notes overall marriage stress had reduced.     Having increased stress around conflict is [place]. Feels as though he is in a constant states of stress and worry.  Reports he is having nightmares and having frequent nighttime awakenings with panic.    Using alprazolam once a week at this time.  Feels as though he could use it more however has fear around brother with addiction.    Stop taking rosuvastatin at this time.  Understands need for this medication however does not feel that he can tolerate it.  Taking red yeast rice.    Recently seen in IC for infection of scalp wound.  Reports overall this is resolving while taking antibiotics.  He notes some consistent painful nodules behind his right ear and neck region.  Notes symptoms are constant and stable in severity.  Notes no associated fever, chills, discharge or redness.    ALLERGIES   No Known Allergies     CURRENT MEDICATIONS:     Current Outpatient Medications   Medication Sig Dispense Refill    modafinil 100 MG Oral Tab Take 1 tablet (100 mg total) by mouth daily. 30 tablet 0    testosterone cypionate 200 mg/mL  Intramuscular Solution Inject 0.35 ml (70 mg) into the muscle every 5 days. 6 mL 0    rosuvastatin 40 MG Oral Tab Take 1 tablet (40 mg total) by mouth daily. 90 tablet 0    Needle, Disp, (BD DISP NEEDLES) 22G X 1-1/2\" Does not apply Misc 1 needle every five days 50 each 0    Syringe, Disposable, 1 ML Does not apply Misc Use 1 syringe with needle every 5 days. 50 each 0    aspirin 81 MG Oral Tab EC Take 1 tablet (81 mg total) by mouth daily.      clopidogrel 75 MG Oral Tab Take 1 tablet (75 mg total) by mouth.      losartan 25 MG Oral Tab Take 1 tablet (25 mg total) by mouth daily.      metoprolol succinate ER 25 MG Oral Tablet 24 Hr Take 1 tablet (25 mg total) by mouth daily.      CUSTOM MEDICATION 22 gauge 1.5 inch needles   Use 1 needle every 5 days 30 each 0    CUSTOM MEDICATION Syringes with needle (0.5 or 1ml)  Use 1 syringe with needle every 5 days. 30 each 0    hydrOXYzine 25 MG Oral Tab Take 1 tablet (25 mg total) by mouth nightly as needed for Anxiety (sleep). 90 tablet 0    Glucose Blood (ACCU-CHEK GUIDE) In Vitro Strip Use 1 strip twice daily 200 strip 0    ALPRAZolam 0.5 MG Oral Tab Take 1 tablet (0.5 mg total) by mouth nightly as needed. 30 tablet 1    clotrimazole 1 % External Cream Apply 1 Application topically 2 (two) times daily. 28 g 0       MEDICAL HISTORY:     Past Medical History:    Anxiety    Diabetes (HCC)    Diet-controlled diabetes mellitus (HCC)    Guillain-[place] (HCC)    High cholesterol    Hyperlipidemia    Low testosterone    Non-alcoholic fatty liver disease    Tubular adenoma of colon    repeat CLN in 2030       SURGICAL HISTORY:     Past Surgical History:   Procedure Laterality Date    Angioplasty (coronary)  03/05/2024    Colonoscopy N/A 02/22/2023    Procedure: COLONOSCOPY;  Surgeon: SPENSER Flores MD;  Location: [facility] ENDO    Cyst removal      Hernia surgery         FAMILY HISTORY:      Family History   Problem Relation Age of Onset    Diabetes Mother     High Cholesterol Mother      High Cholesterol Father     Glaucoma Neg     Macular degeneration Neg        SOCIAL HISTORY:     Social History     Socioeconomic History    Marital status:    Tobacco Use    Smoking status: Former     Current packs/day: 0.00     Types: Cigarettes    Smokeless tobacco: Never   Vaping Use    Vaping status: Never Used   Substance and Sexual Activity    Alcohol use: Never    Drug use: Yes     Types: Cannabis   Other Topics Concern    Special Diet No     Social Determinants of Health      Received from [facility], [facility]    Social Connections    Received from [facility], [facility]    Housing Stability       REVIEW OF SYSTEMS:   Review of Systems     See HPI for pertinent positives and negatives     PHYSICAL EXAM:     Vitals:    05/24/24 1309   BP: 120/80   BP Location: Right arm   Patient Position: Sitting   Cuff Size: adult   Pulse: 64   SpO2: 98%   Weight: 172 lb 9.6 oz (78.3 kg)   Height: 6' (1.829 m)       Physical Exam     Physical Exam  Constitutional:       Appearance: Normal appearance.   Neurological:      General: No focal deficit present.      Mental Status: She is alert and oriented to person, place, and time.   Psychiatric:         Mood and Affect: Mood normal.    ASSESSMENT AND PLAN:     Patient is here for a follow-up since her last visit he has needed 2 stents due to a 90% closure of the left anterior artery.  He is not feeling good on the blood pressure, statins, blood thinner medications.    Had in-depth conversation about diet and how an anti-inflammatory diet would give him the best benefit at lowering his blood sugars as well as his cholesterol.  In the interim if he cannot do an anti-inflammatory diet I have recommended increasing his fiber in his protein and using good fats to maintain his weight.  Patient may have follow-up with a integrated cardiologist.    Patient struggles with sleep and has benefit  from a small amount of modafinil during the day I have prescribed this and he will take the fourth to half of a tablet as needed.    I do believe part of his chronic fatigue is due to a blunted cut cortisol curve.  He will use either Rhodiola or L-theanine as needed for calming effect.  I do not want him taking a combination Adapta gin at this time.  Discussed using things like Chi gong orc somatic yoga for calming effects.    Patient has low testosterone we do not have that number yet but he does have elevated estradiol.  For elevated estradiol patient will start DIM and based on testosterone results we may do a small dose of anastrozole once a week to maintain these results.    Patient struggles with allergies and based on his results we are not seeing any positive environmental allergens.  I do still recommend using D-Hist or Zyrtec but not at the same time to maintain symptoms.  Patient can also establish behavioral changes such as showering before bed when he has been outside and leaving the close in the bathroom that may have been outside with him to keep his sleeping environment free of allergens.      1. Elevated homocysteine  - modafinil 100 MG Oral Tab; Take 1 tablet (100 mg total) by mouth daily.  Dispense: 30 tablet; Refill: 0    2. Chronic fatigue  - modafinil 100 MG Oral Tab; Take 1 tablet (100 mg total) by mouth daily.  Dispense: 30 tablet; Refill: 0    3. Sleep concern    4. Low testosterone    5. High cholesterol      Time spent with patient: Over 45 minutes spent in chart review and in direct communication with patient obtaining and reviewing history, creating a unique care plan, explaining the rationale for treatment, reviewing potential SE and overall treatment plan,  documenting all clinical information in Epic. Over 50% of this time was in education, counseling and coordination of care.     Problem List Items Addressed This Visit    None  Visit Diagnoses       Elevated homocysteine    -   Primary    Relevant Medications    modafinil 100 MG Oral Tab    Chronic fatigue        Relevant Medications    modafinil 100 MG Oral Tab    Sleep concern        Relevant Medications    modafinil 100 MG Oral Tab    Low testosterone        High cholesterol                 Orders Placed This Visit:  No orders of the defined types were placed in this encounter.    No orders of the defined types were placed in this encounter.      Patient Instructions   Based on Testosterone we will either do DIM or anastrazaole   Start DIM     Do not take Dhist with zyrtec     For allergies   Shower before bed when outside  Leave clothes in the bathroom     Cortisol -   Look into [facility]   [email or web address]     Sematic yoga     3 meals a day no snacking 12-14 hour fast every day   Protein goal 75- 100g a day Protein with every meal   Fiber goal 35g a day  Net carb goal <30g per meal    Total Carb - fiber = net carb  Increase in good fats - nuts, olive oil avacado     Future anti inflammatory diet     Fruit Carbohydrates per serving Fiber per serving Net carbs   Grapes (1Cup/151g)  26g 0.8g 25.2g   Banana (1 medium) 24g 3.1g 21g   Pear (1 medium) 22g 6g 16g   Apple (1 medium) 21g 4.4g 16.6g         Pineapple (1Cup/165g) 29g 2.3g 26.7   Blueberries (1Cup/148g) 17g 4g 13g   Cantaloupe (1Cup/177g) 14g 1.6g 12.4g   Oranges (1 medium) 12g 2.3g 9.7g   Watermelon (1Cup/154g) 12g 0.6g 11.4g   Peaches (1 Large) 9.5g 3g 6.5   Kiwi (1 medium) 9g 2.1g 6.9   Avocado (half of one) 9.5g 4.6g 4.9g   Strawberries (1Cup/144g) 8g 3 5g   Lime (1 fruit) 7g 1.9g 5.1g   Lemon (1 fruit) 6g 1.6g 4.4g   Tomatoes (1 fruit) 3.2g  1.5g 1.7g   Vegetables Carbohydrates per serving  Fiber per serving  Net carbs per serving    Asparagus (5 brown/93.5g) 4g 1.5g 2.5g   Bell Pepper (1 medium) 6g 2g 4g   Broccoli (1 medium stalk) 8g 6g 2g   Carrot (1 carrot 7.5inches long) 7g 1.7g 5.3g   Cauliflower (99g)  5g 2g 3g   Celery (2 medium stalks) 4g 2g 2g   Cucumber (?  medium) 2g 1g 1g   Green Beans (3/4C)  5g 3g 2g   Green Cabbage (84g) 5g 2g 3g   Green Onion (1/4C chopped) 2g 1g 1g   Iceberg lettuce (89g)  2g 1.1g 0.9g   Jalapeno (1C sliced 6g 2.5g 3.5g   Leaf lettuce (1/2C shredded) 2g 1g 1.g   Mushrooms (5 medium) 3g 1g 2g   Onion (1 medium) 11g 1.9g 9g   Potatoe (1 medium/148g) 26g 2g 24g   Radishes (7 radishes) 3g 0.7g 2.3g   Summer Squash (½ medium) 4g 1.1g 2.9g   Sweet corn (1 medium ear) 18g 2.4g 15.6   Sweet Potato (1 medium) 23g 4g 19g           ANTI-INFLAMMATORY LIFESTYLE FOODS    General Diet Recommendations   Eat protein at every meal emphasizing salmon, mackerel, sardines, and herring  Limit red meat consumption to two meals per week  Avoid omega 6 oils that aggravate inflammation   corn oil  soybean oil  safflower oil   sun flower oil  Avoid hydrogenated vegetable oils and fried foods  Avoid white flour and all refined carbohydrates including cereals, pasta, bread, bagels and English muffins. Replace these with brown rice, sweet potato, or steel-cut oats  Eat spices such as shilo, garlic, turmeric, cilantro, rosemary, oregano, and thyme.   Drink plenty of water (enough until urine is clear/faint yellow color and odorless)  Eat 5 servings of fresh vegetables daily   Eat no more than 3 servings of fresh fruit daily    Avoid sugar      Include in abundance   Fish: Herman, Mackerel, Sardines, or Herring  Dark Green Leafy Vegetables  Yellow, Orange, and Red Vegetables  Bok marino  Celery  Beets  Broccoli  Blueberries  Pineapple  Walnuts  Coconut Oil  Tej Seeds  Flax Seeds  Turmeric  Shilo  Olive oil   Avoid   Sugar  Artificial Sweeteners  Saturated and Trans fats   Hydrogenated Oils  Canola  Corn Oil  Peanut  Soy  Omega 6 Fatty Acids  Refined Carbohydrates  Processed foods  MSG  Gluten- Breads, Crackers, Pastries, Cereals  Dairy/Casein  Alcohol  Deli Meats       RECIPE IDEAS  Breakfast   ENERGIZING PINEAPPLE SMOOTHIE  Ingredients  1 cup brewed and cooled green tea  2  cups spinach or kale  1 cup frozen pineapple chunks  ? cup cucumber, peeled and cut into large chunks)  ½ cup frozen delmis chunks  ½ of a medium banana, peeled  ½” fresh shilo - peeled and cut from stalk (about ½ tsp)  ¼ tsp ground turmeric  3 mint leaves - rough chopped  1 scoop protein powder  1 Tbsp chris seeds  4-5 ice cubes (or more or less to personal desired consistency)    Directions  1.  Combine all the ingredients, except the chris seeds, in a high speed .    2.  Add chris seeds at the end of the blending process so they don’t stick to the  container.    3. If you like your smoothie thicker, add ice cubes and blend until desired consistency is met. AVOCADO TOAST WITH EGG  Ingredients  1 slice of gluten-free bread, toasted  1½ tsp ghee  ½ an avocado  Handful of spinach  1 egg, poached or scrambled  Red pepper flakes  This combination is simple to create.    Directions  1.  Toast the gluten-free bread and top with ghee.    2.  Spread the avocado onto the toast. Place fresh spinach leaves on top of the avocado and then top all with a poached or scrambled egg and finish it off with a sprinkle of red pepper flakes.    3. Enjoy open faced with a fork and knife, or add a second piece of toast to make it a sandwich.         CHRIS QUINOA PORRIDGE  Ingredients  1 cup thick cashew milk  2 cups cooked quinoa  1 cup fresh organic blueberries (or frozen)  ¼ cup toasted walnuts  ½ tsp ground cinnamon  2 tsp raw honey  1 Tbsp chris seeds    Directions  1.  Combine the quinoa and cashew milk in a saucepan and slowly warm over medium low heat.    2.  Stir in blueberries, cinnamon and walnuts until all are evenly warmed. Remove from heat and stir in raw honey. Top with chris seeds.    3.  Serve in bowls and top with raw cacao nibs for an added pop of antioxidants.                  http://www.[employer].com/health/centers/rheumatoid_arthritis/articles/famous_chefs_recipes_for_your_antiinflammatory_diet.aspx                    Lunch   LENTIL VEGETABLE SOUP  Ingredients   1 pound Luxembourgish green lentils, dry   4 cups chopped yellow onions (3 large onions)   4 cups chopped leeks, white part only (2 leeks)   1 tablespoon minced garlic (3 cloves)   1/4 cup good olive oil, plus additional for drizzling on top   1 tablespoon kosher salt   1-½ teaspoons freshly ground black pepper   1 tablespoon minced fresh thyme leaves or 1 teaspoon dried   1 teaspoon ground cumin   3 cups medium-diced celery (8 stalks)   3 cups medium-diced carrots (4-6 carrots)   3 quarts chicken stock   1/4 cup tomato paste   2 tablespoons red wine or red wine vinegar   Freshly grated Parmesan cheese     Directions  1. In a large bowl, cover the lentils with boiling water and let sit for 15 minutes. Drain.     2. In a large stockpot over medium heat, sauté the onions, leeks, and garlic with the olive oil, salt, pepper, thyme and cumin for 20 minutes, until the vegetables are translucent and very tender.    3. Add the celery and carrots and sauté for 10 minutes. Add the chicken stock, tomato paste and lentils. Cover and bring to a boil. Reduce heat and simmer uncovered for 1 hour, until the lentils are cooked through.     4. Check the seasonings. Add the red wine and serve hot, drizzled with olive oil and sprinkled with grated Parmesan.  GRILLED EGGPLANT SALAD SERVES  Ingredients   1 Italian eggplant, cut into 1-inch-thick slices   1 large red onion, cut into rounds Canola oil   1 avocado, halved, pitted, peeled and chopped   1 tablespoon red wine vinegar   1 teaspoon Ayden mustard   1 tablespoon coarsely chopped oregano leaves   Honey   Olive oil  Salt   Freshly ground black pepper   1 lemon, zested     Directions  1. Brush the eggplant and red onions with canola oil and arrange on the grill. Cook the eggplant until soft and grill  the onions until they have a slight henry.     2. Remove from the grill to a cutting board and let cool slightly. Once cool, roughly chop and add them to a serving bowl along with the avocado.     3. In a small bowl, whisk together the red wine vinegar, Ayden and oregano.     4. Add honey and olive oil, to taste, and blend until emulsified. Season with salt and pepper, to taste. 5. Add the dressing to the eggplant mixture and toss.                                 WILD RICE PILAF  Ingredients  Gallo Wild Rice  Olive oil  Vegetable stock  1 cup sliced mushrooms  1 minced shallot  ½ cup chopped celery    Directions  1. Follow the cooking instructions on a package of “Gallo Wild Rice Blend” using olive oil instead of butter and vegetable stock instead of water.     2. In a non-stick pan saute 1 cup of sliced mushrooms, 1 minced shallot, ½ cup chopped celery, and 1 clove of minced garlic in 3 tbsp olive oil.     3. When the rice is done, combine with the mushroom mixture and serve.      http://www.[employer]/health/centers/rheumatoid_arthritis/articles/famous_chefs_recipes_for_your_antiinflammatory_diet.aspx  Dinner   ROASTED CHICKEN WITH BALSAMIC VINAIGRETTE  Ingredients   1/4 cup balsamic vinegar   2 tablespoons Ayden mustard   2 tablespoons fresh lemon juice   2 garlic cloves, chopped   2 tablespoons olive oil   Salt   Freshly ground black pepper   1 (4-pound) whole chicken, cut into pieces (reserve giblets, neck and backbone for another use)   1/2 cup low-salt chicken broth   1 teaspoon lemon zest   1 tablespoon chopped fresh parsley leaves     Directions  1. Whisk the vinegar, mustard, lemon juice, garlic, olive oil, salt and pepper in small bowl to blend.     2. Combine the vinaigrette and chicken pieces in a large, resealable plastic bag; seal the bag and toss to coat. Refrigerate, turning the chicken pieces occasionally, for at least 2 hours and up to one day.     3. Preheat the oven to 400 degrees F.  Remove chicken from the bag and arrange pieces on a large, greased baking dish.     4. Roast until the chicken is just cooked through, about 1 hour. If your chicken browns too quickly, cover it with foil for the remaining cooking time.    5. Transfer the chicken to a serving platter.    HERBED SALMON  Ingredients  1/2 seedless cucumber   2 small plum tomatoes   1 shallot or 1/4 red onion, finely chopped, divided   2 tablespoons Ayden mustard   2 tablespoons sugar   1/4 cup white wine vinegar   1/2 cup extra-virgin olive oil, plus a drizzle   1/4 cup finely chopped fresh dill   Salt  Freshly ground black pepper 4 (6-ounce) skinless salmon fillets   Seafood seasoning (recommended: [facility])     Directions  1. Dice cucumber into 1/4-inch pieces - to peel or not to peel is up to you. Seed and dice the tomatoes into 1/4-inch pieces. Combine cucumber, tomatoes and half the shallots or red onion in a bowl and set aside.     2. In a small bowl, whisk together mustard, sugar and white wine vinegar and remaining half of the shallots. Stream in extra-virgin olive oil while continuing to whisk. Stir in dill and season dressing with salt and pepper to taste.     3. Season the salmon with seafood seasoning and a little black pepper.     4. Heat a nonstick skillet with a drizzle of extra-virgin olive oil over medium-high heat. Place salmon rounded side down and cook until scruggs and a little crispy at the edges, 3-4 minutes. Flip and cook 2 minutes more for a pink center, or 4 minutes for opaque fish.     5. Transfer salmon to dinner or serving plates, top with the cucumber-tomato relish and cover with a liberal amount of dill dressing.  BLACK FRIED RICE W/ SNAP PEAS  Ingredients  2 tablespoons Organic Extra Virgin Coconut Oil  2 medium carrots, diced  1 small yellow onion, diced  1 bunch scallions, white and green parts , thinly sliced  1 cup thinly sliced snap peas  2 garlic cloves, minced  1 tablespoon minced fresh  shilo  3 cups cooked black rice (from 1 cup uncooked)  3 tablespoons Liquid Aminos  2 teaspoons toasted sesame oil  1 teaspoon sriracha  2 eggs, beaten  1 tablespoon Organic Shelled Hemp Seed    Directions  1. In a large wok or nonstick skillet heat the coconut oil. Sauté the carrot, onion, and white scallion over high heat until soft and beginning to brown, about 5 minutes.     2.  Add the snap peas, garlic, shilo, and green scallions and stir-chavez until fragrant, another 2 minutes. Fold in the rice and stir-chavez until well-coated in the vegetable mixture and beginning to toast, 2 minutes.     3.  Add the liquid aminos, sesame oil, and sriracha and stir to combine. Push the rice to the side of the pan to create a well. Pour the eggs into the center and cook, stirring gently, until nearly set.     4.  Toss the fried rice with the eggs and hemp seeds. Transfer the fried rice to bowls and serve right away.            http://[employer]/fried-forbiddenblack-rice-recipe-with-snap-peas-and-scallions/   http://www.[employer] [place]/health/centers/rheumatoid_arthritis/articles/famous_chefs_recipes_for_your_antiinflammatory_diet.aspx    Baked Goods/Snacks   BANANA NUT MUFFINS  Ingredients  1 cup almond flour  1 cup gluten-free sprouted flour blend (brown rice, oat and sorghum blend)  1 teaspoon cinnamon  ½ teaspoon salt  2 teaspoons baking soda  2 eggs or 2 flax eggs (flax eggs: 1/4 cup water + 2 tablespoons ground flax)  ¼ cup water  ½ cup maple syrup  2 very ripe bananas, mashed  ½ cup walnuts, crushed  TOPPINGS:  1 banana, cut in half and thinly sliced  Crushed walnuts     Directions   1.  Preheat oven to 350 F.    2.  Mix all ingredients in a large bowl.  3.  Fill greased or paper-lined muffin cups 2/3 full.  4. Add banana slice and walnuts and bake for 15-18 minutes.   NO BAKE ALMOND BARS  Ingredients  3/4 cup almond flour  3/4 cup unsweetened finely shredded coconut  3/4 cup equivalent powdered sweetener (Use  powdered coconut sugar for Paleo or powdered sweetener)  1 cup + 2 Tbsp almond butter (or any nut butter)  2 Tbsp coconut oil  4 1/2 oz dark chocolate?    Directions  1.  In a large bowl, combine the almond flour, coconut, and sweetener.     2. Over medium-low heat melt 1 cup of almond butter and coconut oil.    3. Once melted, add the almond butter to the dry ingredients and mix well.    4. Press the mixture into a 8\" x 8\" baking dish.    5. Over medium-low heat melt 2 Tbsp of almond butter and the chocolate.    6. Once melted, pour the chocolate over the almond butter mixture and smooth out the top for even coverage.    7. Refrigerate for 2 hours or until the almond butter mixture has set. To reduce the amount of time for the almond butter mixture to set place the bars in the freezer until set then cut into 12 even bars.   BAKED APPLES  Ingredients  4 medium apples  1 lemon (juice and rind)  1 cinnamon stick  Currants or raisins     Directions  1.  Place 4 medium apples that have been washed and cored in a large glass oven proof bowl (with lid).     2.  Place a strip of lemon rind, ¼ of a whole vanilla bean, 1 cinnamon stick, and some currants or raisins inside each apple. Drizzle apples with ¼ cup lemon juice and scatter remainder of currants around them.     3.  Bake covered at 350 degrees for 60 to 75 minutes. Serve warm or cold, you can also drizzle with almond milk.       [email or web address].com/healthy-snacks/  Anti inflammatory diet will be the best to manage elevated glucose and cholesterol     Take a 1/4 or 1/4 tab of modafinil as needed     Can use rodiola   Can use D hist  With blood thinners and blood med  Monitor and low blood pressure   If you get dizzy when you stand up watch blood pressure       Return for after cardiology appointment 60.    Patient affirmed understanding of plan and all questions were answered.     Shanell Juárez PA-C

## 2024-05-29 LAB
SEX HORM BIND GLOB: 26.7 NMOL/L
TESTOST % FREE+WEAK BND: 29.2 %
TESTOST FREE+WEAK BND: 297.6 NG/DL
TESTOSTERONE TOT /MS: 1019.1 NG/DL

## 2024-05-30 ENCOUNTER — PATIENT MESSAGE (OUTPATIENT)
Dept: INTEGRATIVE MEDICINE | Facility: CLINIC | Age: 49
End: 2024-05-30

## 2024-06-05 RX ORDER — ANASTROZOLE 1 MG/1
0.5 TABLET ORAL WEEKLY
Qty: 6 TABLET | Refills: 0 | Status: SHIPPED | OUTPATIENT
Start: 2024-06-05 | End: 2024-09-03

## 2024-06-28 ENCOUNTER — PATIENT MESSAGE (OUTPATIENT)
Dept: INTEGRATIVE MEDICINE | Facility: CLINIC | Age: 49
End: 2024-06-28

## 2024-06-28 DIAGNOSIS — R79.89 LOW TESTOSTERONE: Primary | ICD-10-CM

## 2024-07-04 ENCOUNTER — LAB ENCOUNTER (OUTPATIENT)
Dept: LAB | Facility: HOSPITAL | Age: 49
End: 2024-07-04
Attending: PHYSICIAN ASSISTANT
Payer: COMMERCIAL

## 2024-07-04 DIAGNOSIS — R79.89 LOW TESTOSTERONE: ICD-10-CM

## 2024-07-04 LAB — ESTRADIOL SERPL-MCNC: 24.8 PG/ML

## 2024-07-04 PROCEDURE — 84410 TESTOSTERONE BIOAVAILABLE: CPT

## 2024-07-04 PROCEDURE — 36415 COLL VENOUS BLD VENIPUNCTURE: CPT

## 2024-07-04 PROCEDURE — 82670 ASSAY OF TOTAL ESTRADIOL: CPT

## 2024-07-10 LAB
SEX HORM BIND GLOB: 41.7 NMOL/L
TESTOST % FREE+WEAK BND: 18.5 %
TESTOST FREE+WEAK BND: 96.8 NG/DL
TESTOSTERONE TOT /MS: 523 NG/DL

## 2024-07-25 ENCOUNTER — TELEPHONE (OUTPATIENT)
Dept: INTEGRATIVE MEDICINE | Facility: CLINIC | Age: 49
End: 2024-07-25

## 2024-07-25 NOTE — TELEPHONE ENCOUNTER
Patient is scheduled tomorrow 7/26 but has not seen cardiologist yet (appointment was postponed until next week). Please advise if patient should keep appointment for tomorrow or follow-up next available 9/18.    Thank you

## 2024-07-26 NOTE — TELEPHONE ENCOUNTER
Patient cancelled appointment for today. Will keep future appointment and try to get in sometime in August.

## 2024-07-29 ENCOUNTER — TELEPHONE (OUTPATIENT)
Dept: FAMILY MEDICINE CLINIC | Facility: CLINIC | Age: 49
End: 2024-07-29

## 2024-07-29 DIAGNOSIS — Z95.820 S/P ANGIOPLASTY WITH STENT: Primary | ICD-10-CM

## 2024-07-29 NOTE — TELEPHONE ENCOUNTER
Referral for cardiology placed, message sent to [facility] for referral authorization assistance.

## 2024-07-29 NOTE — TELEPHONE ENCOUNTER
Representative from [facility] called to get a referral for patient's upcoming appointment. Per nurses referral put in and will have to go through managed care.

## 2024-07-31 ENCOUNTER — TELEPHONE (OUTPATIENT)
Dept: FAMILY MEDICINE CLINIC | Facility: CLINIC | Age: 49
End: 2024-07-31

## 2024-07-31 NOTE — TELEPHONE ENCOUNTER
Cardiology office requested referral to be faxed to [phone]. Patient has an appointment today .

## 2024-08-01 RX ORDER — ROSUVASTATIN CALCIUM 40 MG/1
40 TABLET, COATED ORAL DAILY
Qty: 90 TABLET | Refills: 0 | OUTPATIENT
Start: 2024-08-01

## 2024-08-01 NOTE — TELEPHONE ENCOUNTER
Called patient to schedule medication follow up appointment. Patient stated he thought cardiologist handled this medication. I informed him if his cardiologist is taking care of this medication we can forget about the appointment but if he's not he would have to call us back and make an appointment in order to refill medication. Patient understood.

## 2024-08-08 ENCOUNTER — TELEMEDICINE (OUTPATIENT)
Dept: INTEGRATIVE MEDICINE | Facility: CLINIC | Age: 49
End: 2024-08-08
Payer: COMMERCIAL

## 2024-08-08 VITALS — BODY MASS INDEX: 23.3 KG/M2 | WEIGHT: 172 LBS | HEIGHT: 72 IN

## 2024-08-08 DIAGNOSIS — R79.89 ELEVATED HOMOCYSTEINE: ICD-10-CM

## 2024-08-08 DIAGNOSIS — R79.89 LOW TESTOSTERONE: Primary | ICD-10-CM

## 2024-08-08 DIAGNOSIS — F43.0 STRESS REACTION: ICD-10-CM

## 2024-08-08 DIAGNOSIS — E78.00 HIGH CHOLESTEROL: ICD-10-CM

## 2024-08-08 NOTE — PROGRESS NOTES
Rio Johnson is a 48 year old male.  Chief Complaint   Patient presents with    Follow - Up     Patient presents for follow up.        HPI:   Rio presents for follow up,     Updates from last visit: hormones, cholesterol, possible mold toxin exposure     Body/skin:   Went to the ER due to high blood pressure.     Cardiologist - stopped the metoprolol.  He is on cholesterol medication. He wants him to be below 50 LDL   He is not sure if the rosuvastatin is affecting his pain in his back and neuropathy  He is doing exercises which is helping     No event just stent placement     Hormones -   When his hormones were high   He was low in the 7/4/24   He does not feel as great as when he was higher dose  When his Testosterone was higher he was not more irritable         Lifestyle Factors affecting health:   Diet -   He tries to stay away from carbs.     Supplements: Red yeast rice with co q 10 (stopped red rice yeast Coq 10 alone)   Policasonol 80mg - stopped   Berberine 500mg 1-2 times a day   Fish oil - ultra EPA concentrate - He is taking a high EPA oil - cardiologist is trying to get rx epa   L theanine for stress   Rodiola for stress   THC as needed   Mitochor -  Vitamin A d and k pill - stopped (pure with D3+ k2)   Cortisol manager sometimes in the evening. - stopped   Phosphatidyl choline - no improvement yet with neuro pain       HPI's FROM PREVIOUS VISITS      Rio presents for follow up,     Updates from last visit:   He had a calcium score that was high. He went to a cardiologist. 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 He had two stents place.     Surgery was March 10th. They started metoprolol and losartan daily. He is on crestor     Thyroid: numbers are looking good with TSH and T4     Body/skin:   He feels he is having low back and leg pain. He started taking crestor every other day.   He follows up on cardiology in July. They will look at removing BP medication     He has more  nasal congestion and coughing. He is taking zyrtec that makes him sleeping     Mental State: he stopped prozac and was switched to hydroxyzine as needed. It helps him sleep.     Sleep - Hydroxyzine helps with his sleep. 1-2 times a week he needs medication.    Marriage is hard  Child is 2.5   Pressure with work and school. When he feels like he needs to study and cannot he will get more stressed        HPI's FROM PREVIOUS VISITS    Rio presents for follow up,     He has been on cholestryramine for cholesterol in the past. He was having a hard time tolerating it. He is now taking supplements     For stress he has tried Wellbutrin     Hormones - He is on testosterone 0.35ml every 5 days. No libido or erection issues. He was on hormones for IVF.     GI - no concerns       Lifestyle Factors affecting health:   Diet - He tried to do low carb, low sugar intermittent fasting. He will eat sour dough bread. He has fallen off for the holidays and trying to get back on. He will have high good fats. Avoids seed oils. He drops weight quickly and will get groggy.     Morning - pink sea salt and water  Hummus, cheese sourdough bread.     Exercise -He gos through phases. Where he is going to the gym a few times a week.      Stress - work and marriage stress. He has a young child. Stress and anxiety worsens his sleep. Working out and avoiding stimulants help.     He is taking business classes    He is struggling with what is happening in [place]     Sleep - He has a racing mind when he tries to sleep. He takes magnesium glycinate and glycine at night and l theanine.   When he has a racing mind he will take xanax. He will do that once a week max if he is not getting sleep.     He can fall asleep when he is really tired but then he wakes up 4 hours later.     He had Guillain-Barré syndrome and has residual fatigue and pain. He burns out fast.   He will take a amino acid drink and pre work out to help physical and mental  boost.       HPI:     47 yo present for follow up.     Off Bupropion for 2 months and notes overall doing well. Felt to many side effects with increased increased.  Notes overall marriage stress had reduced.     Having increased stress around conflict is [place]. Feels as though he is in a constant states of stress and worry.  Reports he is having nightmares and having frequent nighttime awakenings with panic.    Using alprazolam once a week at this time.  Feels as though he could use it more however has fear around brother with addiction.    Stop taking rosuvastatin at this time.  Understands need for this medication however does not feel that he can tolerate it.  Taking red yeast rice.    Recently seen in IC for infection of scalp wound.  Reports overall this is resolving while taking antibiotics.  He notes some consistent painful nodules behind his right ear and neck region.  Notes symptoms are constant and stable in severity.  Notes no associated fever, chills, discharge or redness.    ALLERGIES   No Known Allergies     CURRENT MEDICATIONS:     Current Outpatient Medications   Medication Sig Dispense Refill    anastrozole 1 MG Oral Tab tab Take 0.5 tablets (0.5 mg total) by mouth once a week. 6 tablet 0    testosterone cypionate 200 mg/mL Intramuscular Solution Inject 0.35 ml (70 mg) into the muscle every 5 days. 6 mL 0    rosuvastatin 40 MG Oral Tab Take 1 tablet (40 mg total) by mouth daily. 90 tablet 0    Needle, Disp, (BD DISP NEEDLES) 22G X 1-1/2\" Does not apply Misc 1 needle every five days 50 each 0    Syringe, Disposable, 1 ML Does not apply Misc Use 1 syringe with needle every 5 days. 50 each 0    aspirin 81 MG Oral Tab EC Take 1 tablet (81 mg total) by mouth daily.      clopidogrel 75 MG Oral Tab Take 1 tablet (75 mg total) by mouth.      losartan 25 MG Oral Tab Take 1 tablet (25 mg total) by mouth daily.      CUSTOM MEDICATION 22 gauge 1.5 inch needles   Use 1 needle every 5 days 30 each 0    CUSTOM  MEDICATION Syringes with needle (0.5 or 1ml)  Use 1 syringe with needle every 5 days. 30 each 0    hydrOXYzine 25 MG Oral Tab Take 1 tablet (25 mg total) by mouth nightly as needed for Anxiety (sleep). 90 tablet 0    Glucose Blood (ACCU-CHEK GUIDE) In Vitro Strip Use 1 strip twice daily 200 strip 0    ALPRAZolam 0.5 MG Oral Tab Take 1 tablet (0.5 mg total) by mouth nightly as needed. 30 tablet 1    clotrimazole 1 % External Cream Apply 1 Application topically 2 (two) times daily. 28 g 0    metoprolol succinate ER 25 MG Oral Tablet 24 Hr Take 1 tablet (25 mg total) by mouth daily. (Patient not taking: Reported on 8/8/2024)         MEDICAL HISTORY:     Past Medical History:    Anxiety    Diabetes (HCC)    Diet-controlled diabetes mellitus (HCC)    Guillain-[place] (HCC)    High cholesterol    Hyperlipidemia    Low testosterone    Non-alcoholic fatty liver disease    Tubular adenoma of colon    repeat CLN in 2030       SURGICAL HISTORY:     Past Surgical History:   Procedure Laterality Date    Angioplasty (coronary)  03/05/2024    Colonoscopy N/A 02/22/2023    Procedure: COLONOSCOPY;  Surgeon: SPENSER Flores MD;  [facility]    Cyst removal      Hernia surgery         FAMILY HISTORY:      Family History   Problem Relation Age of Onset    Diabetes Mother     High Cholesterol Mother     High Cholesterol Father     Glaucoma Neg     Macular degeneration Neg        SOCIAL HISTORY:     Social History     Socioeconomic History    Marital status:    Tobacco Use    Smoking status: Former     Current packs/day: 0.00     Types: Cigarettes    Smokeless tobacco: Never   Vaping Use    Vaping status: Never Used   Substance and Sexual Activity    Alcohol use: Never    Drug use: Yes     Types: Cannabis   Other Topics Concern    Special Diet No     Social Determinants of Health      [facility]    Social Connections    Received from [facility]  [place], [facility]    Housing Stability       REVIEW OF SYSTEMS:   Review of Systems     See HPI for pertinent positives and negatives     PHYSICAL EXAM:     Vitals:    08/08/24 1127   Weight: 172 lb (78 kg)   Height: 6' (1.829 m)       Physical Exam     Physical Exam  Constitutional:       Appearance: Normal appearance.   Neurological:      General: No focal deficit present.      Mental Status: She is alert and oriented to person, place, and time.   Psychiatric:         Mood and Affect: Mood normal.    ASSESSMENT AND PLAN:     Working on cholesterol and HRT  We will adjust HRT and anastrozole post blood work     Cholesterol - do not stop statin or aspirin at this time. He is considering seeing a integrative cardiologist  Discussed repatha vs statin - I am not as familiar with repatha and long term side effects  Dietary changes and exercise recommendations made     1. Low testosterone    2. High cholesterol    3. Stress reaction      Time spent with patient: Over 45 minutes spent in chart review and in direct communication with patient obtaining and reviewing history, creating a unique care plan, explaining the rationale for treatment, reviewing potential SE and overall treatment plan,  documenting all clinical information in Epic. Over 50% of this time was in education, counseling and coordination of care.     Problem List Items Addressed This Visit    None  Visit Diagnoses       Low testosterone    -  Primary    High cholesterol        Stress reaction                 Orders Placed This Visit:  No orders of the defined types were placed in this encounter.    No orders of the defined types were placed in this encounter.      Patient Instructions   [facility] at [facility]     Stop DIM   After blood draw we will most likely increase T to 0.35ml every 5 days  Based on estradiol we will decide if we stay every week for anastrozole or switch to every other week     Mold toxin - TGFB-1 will look at  immune activity   Future may recommend mycobind     Steps in  lowering High LDL and high triglycerides   1) walk 30 minutes every day  2) Only eat single ingredient foods  3) focus on fruits, vegetables   4) eat whole grains  Barley.  Bulgur, also called cracked wheat.  Farro.  Millet.  Quinoa.  Black rice.  Brown rice.  Red rice.  Wild rice.  Oatmeal.  5) increase fish (wild caught) , poultry (organic)   6) decrease red meat if you eat red meat grass fed preferred     Integrative cardiologist - we will follow back on this           Return in about 12 weeks (around 10/31/2024).    Patient affirmed understanding of plan and all questions were answered.     Shanell Juárez PA-C

## 2024-08-08 NOTE — PATIENT INSTRUCTIONS
Labs [facility] at [facility]     Stop DIM   After blood draw we will most likely increase T to 0.35ml every 5 days  Based on estradiol we will decide if we stay every week for anastrozole or switch to every other week     Mold toxin - TGFB-1 will look at immune activity   Future may recommend mycobind     Steps in  lowering High LDL and high triglycerides   1) walk 30 minutes every day  2) Only eat single ingredient foods  3) focus on fruits, vegetables   4) eat whole grains  Barley.  Bulgur, also called cracked wheat.  Farro.  Millet.  Quinoa.  Black rice.  Brown rice.  Red rice.  Wild rice.  Oatmeal.  5) increase fish (wild caught) , poultry (organic)   6) decrease red meat if you eat red meat grass fed preferred     Integrative cardiologist - we will follow back on this

## 2024-08-09 DIAGNOSIS — R79.89 LOW TESTOSTERONE: ICD-10-CM

## 2024-08-12 NOTE — TELEPHONE ENCOUNTER
A refill request was received for:  Requested Prescriptions     Pending Prescriptions Disp Refills    testosterone cypionate 200 mg/mL Intramuscular Solution 6 mL 0     Sig: Inject 0.35 ml (70 mg) into the muscle every 5 days.     Last refill date:  5/23/24   Qty: 6 mL   Dx:HRT   Last office visit: 8/8/24   When is follow up due: 10/31/24

## 2024-08-13 ENCOUNTER — HOSPITAL ENCOUNTER (OUTPATIENT)
Age: 49
Discharge: LEFT BEFORE DISCHARGE | End: 2024-08-13
Payer: COMMERCIAL

## 2024-08-13 RX ORDER — TESTOSTERONE CYPIONATE 200 MG/ML
INJECTION, SOLUTION INTRAMUSCULAR
Qty: 6 ML | Refills: 0 | Status: SHIPPED | OUTPATIENT
Start: 2024-08-13 | End: 2025-08-09

## 2024-09-16 ENCOUNTER — PATIENT MESSAGE (OUTPATIENT)
Dept: FAMILY MEDICINE CLINIC | Facility: CLINIC | Age: 49
End: 2024-09-16

## 2024-09-16 DIAGNOSIS — M54.31 SCIATICA OF RIGHT SIDE: Primary | ICD-10-CM

## 2024-09-16 NOTE — TELEPHONE ENCOUNTER
From: Rio Johnson  To: Carlos A Hu  Sent: 9/16/2024 1:38 PM CDT  Subject: Chiropractor referral [facility] Dr. Hu,    I have met my maximum 12 approved visits for chiropractic. It is helping my hip pain a lot but I need to continue. Can you please send a request for additional visits to Everette Gupta?     Thank you    normal (ped)...

## 2024-11-08 ENCOUNTER — TELEPHONE (OUTPATIENT)
Dept: FAMILY MEDICINE CLINIC | Facility: CLINIC | Age: 49
End: 2024-11-08

## 2024-11-08 RX ORDER — CLOPIDOGREL BISULFATE 75 MG/1
75 TABLET ORAL DAILY
Qty: 30 TABLET | Refills: 0 | Status: SHIPPED | OUTPATIENT
Start: 2024-11-08

## 2024-11-08 RX ORDER — ASPIRIN 81 MG/1
81 TABLET ORAL DAILY
Qty: 30 TABLET | Refills: 0 | Status: SHIPPED | OUTPATIENT
Start: 2024-11-08 | End: 2024-11-08

## 2024-11-08 RX ORDER — CLOPIDOGREL BISULFATE 75 MG/1
75 TABLET ORAL DAILY
Qty: 30 TABLET | Refills: 0 | Status: SHIPPED | OUTPATIENT
Start: 2024-11-08 | End: 2024-11-08

## 2024-11-08 RX ORDER — ASPIRIN 81 MG/1
81 TABLET ORAL DAILY
Qty: 30 TABLET | Refills: 0 | Status: SHIPPED | OUTPATIENT
Start: 2024-11-08

## 2024-11-08 RX ORDER — LOSARTAN POTASSIUM 25 MG/1
25 TABLET ORAL DAILY
Qty: 30 TABLET | Refills: 0 | Status: SHIPPED | OUTPATIENT
Start: 2024-11-08 | End: 2024-11-08

## 2024-11-08 RX ORDER — ROSUVASTATIN CALCIUM 40 MG/1
40 TABLET, COATED ORAL DAILY
Qty: 30 TABLET | Refills: 0 | Status: SHIPPED | OUTPATIENT
Start: 2024-11-08 | End: 2024-11-08

## 2024-11-08 RX ORDER — ROSUVASTATIN CALCIUM 40 MG/1
40 TABLET, COATED ORAL DAILY
Qty: 30 TABLET | Refills: 0 | Status: SHIPPED | OUTPATIENT
Start: 2024-11-08

## 2024-11-08 RX ORDER — LOSARTAN POTASSIUM 25 MG/1
25 TABLET ORAL DAILY
Qty: 30 TABLET | Refills: 0 | Status: SHIPPED | OUTPATIENT
Start: 2024-11-08

## 2024-11-08 NOTE — TELEPHONE ENCOUNTER
Patient called and stated that rx he was requesting today morning from Dr Hu was sent to [facility] pharmacy . Patient is traveling to [place] . [facility] [place] tel.[phone].

## 2024-11-08 NOTE — TELEPHONE ENCOUNTER
49-year-old male, currently out of town in [place] and forgot his cardiac medications usually prescribed and managed through cardiology.  Will send Rx to [facility].

## 2024-11-19 DIAGNOSIS — R79.89 LOW TESTOSTERONE: ICD-10-CM

## 2024-11-20 NOTE — TELEPHONE ENCOUNTER
A refill request was received for:  Requested Prescriptions     Pending Prescriptions Disp Refills    testosterone cypionate 200 mg/mL Intramuscular Solution 6 mL 0     Sig: Inject 0.35 ml (70 mg) into the muscle every 5 days.     Last refill date:  8/13/2024  Qty: 6 mL an 0  Dx: low testosterone  Last office visit: 8/8/2024  When is follow up due: 12 weeks    No future appointments.

## 2024-11-21 RX ORDER — TESTOSTERONE CYPIONATE 200 MG/ML
INJECTION, SOLUTION INTRAMUSCULAR
Qty: 6 ML | Refills: 0 | Status: SHIPPED | OUTPATIENT
Start: 2024-11-21 | End: 2025-11-15

## 2024-12-02 DIAGNOSIS — R79.89 LOW TESTOSTERONE: ICD-10-CM

## 2024-12-02 RX ORDER — NEEDLES, DISPOSABLE 25GX5/8"
NEEDLE, DISPOSABLE MISCELLANEOUS
Qty: 50 EACH | Refills: 0 | Status: CANCELLED | OUTPATIENT
Start: 2024-12-02

## 2024-12-03 RX ORDER — NEEDLES, DISPOSABLE 25GX5/8"
NEEDLE, DISPOSABLE MISCELLANEOUS
Qty: 50 EACH | Refills: 0 | Status: SHIPPED | OUTPATIENT
Start: 2024-12-03

## 2024-12-03 RX ORDER — NEEDLES, DISPOSABLE 22GX1 1/2"
NEEDLE, DISPOSABLE MISCELLANEOUS
Qty: 50 EACH | Refills: 0 | Status: SHIPPED | OUTPATIENT
Start: 2024-12-03

## 2024-12-03 RX ORDER — SYRINGE, DISPOSABLE, 1 ML
SYRINGE, EMPTY DISPOSABLE MISCELLANEOUS
Qty: 50 EACH | Refills: 0 | Status: SHIPPED | OUTPATIENT
Start: 2024-12-03

## 2024-12-03 NOTE — TELEPHONE ENCOUNTER
A refill request was received for:  Requested Prescriptions     Pending Prescriptions Disp Refills    EASY TOUCH HYPODERMIC NEEDLE 22G X 1-1/2\" Does not apply Misc [Pharmacy Med Name: Easy Touch Hypodermic Needles 06ra3-0/2\" Mis Mhcm]  0     Sig: USE NEEDLE TO INJECT EVERY 5 DAYS.    Syringe, Disposable, (BD SYRINGE LUER-MIHIR) 1 ML Does not apply Misc [Pharmacy Med Name: Bd Syringe Luer-Mihir/1ml Mis Bd D]  0     Sig: USE 1 SYRINGE WITH NEEDLE EVERY 5 DAYS.    BD HYPODERMIC NEEDLE 18G X 1\" Does not apply Misc [Pharmacy Med Name: Bd Hypodermic Needles 18gx1\" Mis Bd D]  0     Sig: USE WITH SYRINGE TO DRAW UP EVERY 5 DAYS

## 2024-12-04 LAB
ABSOLUTE BASOPHILS: 58 CELLS/UL (ref 0–200)
ABSOLUTE EOSINOPHILS: 110 CELLS/UL (ref 15–500)
ABSOLUTE LYMPHOCYTES: 1694 CELLS/UL (ref 850–3900)
ABSOLUTE MONOCYTES: 609 CELLS/UL (ref 200–950)
ABSOLUTE NEUTROPHILS: 3329 CELLS/UL (ref 1500–7800)
ALBUMIN/GLOBULIN RATIO: 1.6 (CALC) (ref 1–2.5)
ALBUMIN: 4.2 G/DL (ref 3.6–5.1)
ALKALINE PHOSPHATASE: 61 U/L (ref 35–144)
ALT: 23 U/L (ref 9–46)
AST: 19 U/L (ref 10–35)
BASOPHILS: 1 %
BILIRUBIN, TOTAL: 0.7 MG/DL (ref 0.2–1.2)
BUN: 15 MG/DL (ref 7–25)
CALCIUM: 9.3 MG/DL (ref 8.6–10.3)
CARBON DIOXIDE: 30 MMOL/L (ref 20–32)
CHLORIDE: 105 MMOL/L (ref 98–110)
CREATININE: 1.13 MG/DL (ref 0.7–1.3)
EGFR: 79 ML/MIN/1.73M2
EOSINOPHILS: 1.9 %
ESTRADIOL: 25 PG/ML
FREE TESTOSTERONE: 161.2 PG/ML (ref 35–155)
GLOBULIN: 2.6 G/DL (CALC) (ref 1.9–3.7)
GLUCOSE: 99 MG/DL (ref 65–99)
HEMATOCRIT: 49.5 % (ref 38.5–50)
HEMOGLOBIN: 16.6 G/DL (ref 13.2–17.1)
HUMAN TRANSFORMING GROWTH FACTOR BETA 1 (TGF-B1): 3080 PG/ML (ref 0–22062)
LYMPHOCYTES: 29.2 %
MCH: 29.5 PG (ref 27–33)
MCHC: 33.5 G/DL (ref 32–36)
MCV: 88.1 FL (ref 80–100)
MONOCYTES: 10.5 %
MPV: 11.4 FL (ref 7.5–12.5)
NEUTROPHILS: 57.4 %
PLATELET COUNT: 239 THOUSAND/UL (ref 140–400)
POTASSIUM: 4.3 MMOL/L (ref 3.5–5.3)
PROTEIN, TOTAL: 6.8 G/DL (ref 6.1–8.1)
RDW: 12.4 % (ref 11–15)
RED BLOOD CELL COUNT: 5.62 MILLION/UL (ref 4.2–5.8)
SODIUM: 141 MMOL/L (ref 135–146)
TESTOSTERONE, TOTAL,$/MS/MS: 654 NG/DL (ref 250–1100)
WHITE BLOOD CELL COUNT: 5.8 THOUSAND/UL (ref 3.8–10.8)

## 2024-12-05 RX ORDER — LOSARTAN POTASSIUM 25 MG/1
25 TABLET ORAL DAILY
Qty: 30 TABLET | Refills: 0 | OUTPATIENT
Start: 2024-12-05

## 2024-12-05 RX ORDER — SALICYLIC ACID 40 %
81 ADHESIVE PATCH, MEDICATED TOPICAL DAILY
Qty: 30 TABLET | Refills: 0 | OUTPATIENT
Start: 2024-12-05

## 2024-12-05 RX ORDER — ROSUVASTATIN CALCIUM 40 MG/1
40 TABLET, COATED ORAL DAILY
Qty: 30 TABLET | Refills: 0 | OUTPATIENT
Start: 2024-12-05

## 2024-12-05 RX ORDER — CLOPIDOGREL BISULFATE 75 MG/1
75 TABLET ORAL DAILY
Qty: 30 TABLET | Refills: 0 | OUTPATIENT
Start: 2024-12-05

## 2024-12-05 NOTE — TELEPHONE ENCOUNTER
A refill request was received for:  Requested Prescriptions     Pending Prescriptions Disp Refills    [facility] ASPIRIN LOW DOSE 81 MG Oral Tab EC [Pharmacy Med Name: [facility] ASPIRIN EC 81 MG TABLET] 30 tablet 0     Sig: TAKE 1 TABLET BY MOUTH EVERY DAY    LOSARTAN 25 MG Oral Tab [Pharmacy Med Name: LOSARTAN POTASSIUM 25 MG TAB] 30 tablet 0     Sig: TAKE 1 TABLET (25 MG TOTAL) BY MOUTH DAILY.    ROSUVASTATIN 40 MG Oral Tab [Pharmacy Med Name: ROSUVASTATIN CALCIUM 40 MG TAB] 30 tablet 0     Sig: TAKE 1 TABLET BY MOUTH EVERY DAY    CLOPIDOGREL 75 MG Oral Tab [Pharmacy Med Name: CLOPIDOGREL 75 MG TABLET] 30 tablet 0     Sig: TAKE 1 TABLET BY MOUTH EVERY DAY     Last refill date:  11/8/24  Qty: 30 and 0   Dx: HLD, HTN, CAD  Last office visit: 8/8/24  When is follow up due: now      No future appointments.

## 2024-12-16 ENCOUNTER — TELEMEDICINE (OUTPATIENT)
Dept: INTEGRATIVE MEDICINE | Facility: CLINIC | Age: 49
End: 2024-12-16
Payer: COMMERCIAL

## 2024-12-16 DIAGNOSIS — E78.00 HIGH CHOLESTEROL: ICD-10-CM

## 2024-12-16 DIAGNOSIS — R79.89 LOW TESTOSTERONE: Primary | ICD-10-CM

## 2024-12-16 DIAGNOSIS — Z76.89 SLEEP CONCERN: ICD-10-CM

## 2024-12-16 DIAGNOSIS — F43.0 STRESS REACTION: ICD-10-CM

## 2024-12-16 RX ORDER — TESTOSTERONE CYPIONATE 200 MG/ML
200 INJECTION, SOLUTION INTRAMUSCULAR ONCE
Status: SHIPPED | OUTPATIENT
Start: 2024-12-16

## 2024-12-16 NOTE — PATIENT INSTRUCTIONS
Plan  1) Testosterone increase to 0.4ml from 0.35 ml   2) take magnesium glycinate every night for 12 weeks   3) sleep I do believe it is due to your nervous system being stuck in fight or flight   We discussed medication for anxiety  At this time I would recommend identifying triggers and working on sleep hygiene     Sound baths  Binaural beats or singing bowls  Apps  Youtube I prefer this   You want to use headphones  5-30 minutes a day

## 2024-12-16 NOTE — PROGRESS NOTES
Rio Johnson is a 49 year old male.  No chief complaint on file.      HPI:   Rio presents for follow up on labs    Updates from last visit:   TGF - beta   Was 13,000 and now it is down to 3,000   He was having a lot of chronic fatigue and pain. He feels the statins were making him feel worse. Cognitively and neuropathy were worse     Cholestyramine - He is currently on it a week ago. He is also taking the niacine  Some loose stools with medicine     He will do the blood test the day of the shot.     Body/skin:   He is doing PT because he hurt his hip     Hormones -       Sleep -   He has a hard time with sleep. He is waking up 3 oclock, 330.       Supplements:   Coq10  Phosphotidyl serine  Rodiola   L theanine       HPI's FROM PREVIOUS VISITS      Rio presents for follow up,     Updates from last visit: hormones, cholesterol, possible mold toxin exposure     Body/skin:   Went to the ER due to high blood pressure.     Cardiologist - stopped the metoprolol.  He is on cholesterol medication. He wants him to be below 50 LDL   He is not sure if the rosuvastatin is affecting his pain in his back and neuropathy  He is doing exercises which is helping     No event just stent placement     Hormones -   When his hormones were high   He was low in the 7/4/24   He does not feel as great as when he was higher dose  When his Testosterone was higher he was not more irritable         Lifestyle Factors affecting health:   Diet -   He tries to stay away from carbs.     Supplements: Red yeast rice with co q 10 (stopped red rice yeast Coq 10 alone)   Policasonol 80mg - stopped   Berberine 500mg 1-2 times a day   Fish oil - ultra EPA concentrate - He is taking a high EPA oil - cardiologist is trying to get rx epa   L theanine for stress   Rodiola for stress   THC as needed   Mitochor -  Vitamin A d and k pill - stopped (pure with D3+ k2)   Cortisol manager sometimes in the evening. - stopped   Phosphatidyl choline -  no improvement yet with neuro pain       HPI's FROM PREVIOUS VISITS      Rio presents for follow up,     Updates from last visit:   He had a calcium score that was high. He went to a cardiologist. 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 He had two stents place.     Surgery was March 10th. They started metoprolol and losartan daily. He is on crestor     Thyroid: numbers are looking good with TSH and T4     Body/skin:   He feels he is having low back and leg pain. He started taking crestor every other day.   He follows up on cardiology in July. They will look at removing BP medication     He has more nasal congestion and coughing. He is taking zyrtec that makes him sleeping     Mental State: he stopped prozac and was switched to hydroxyzine as needed. It helps him sleep.     Sleep - Hydroxyzine helps with his sleep. 1-2 times a week he needs medication.    Marriage is hard  Child is 2.5   Pressure with work and school. When he feels like he needs to study and cannot he will get more stressed        HPI's FROM PREVIOUS VISITS    Rio presents for follow up,     He has been on cholestryramine for cholesterol in the past. He was having a hard time tolerating it. He is now taking supplements     For stress he has tried Wellbutrin     Hormones - He is on testosterone 0.35ml every 5 days. No libido or erection issues. He was on hormones for IVF.     GI - no concerns       Lifestyle Factors affecting health:   Diet - He tried to do low carb, low sugar intermittent fasting. He will eat sour dough bread. He has fallen off for the holidays and trying to get back on. He will have high good fats. Avoids seed oils. He drops weight quickly and will get groggy.     Morning - pink sea salt and water  Hummus, cheese sourdough bread.     Exercise -He gos through phases. Where he is going to the gym a few times a week.      Stress - work and marriage stress. He has a young child. Stress and anxiety worsens his sleep.  Working out and avoiding stimulants help.     He is taking business classes    He is struggling with what is happening in [place]     Sleep - He has a racing mind when he tries to sleep. He takes magnesium glycinate and glycine at night and l theanine.   When he has a racing mind he will take xanax. He will do that once a week max if he is not getting sleep.     He can fall asleep when he is really tired but then he wakes up 4 hours later.     He had Guillain-Barré syndrome and has residual fatigue and pain. He burns out fast.   He will take a amino acid drink and pre work out to help physical and mental boost.       HPI:     47 yo present for follow up.     Off Bupropion for 2 months and notes overall doing well. Felt to many side effects with increased increased.  Notes overall marriage stress had reduced.     Having increased stress around conflict is [place]. Feels as though he is in a constant states of stress and worry.  Reports he is having nightmares and having frequent nighttime awakenings with panic.    Using alprazolam once a week at this time.  Feels as though he could use it more however has fear around brother with addiction.    Stop taking rosuvastatin at this time.  Understands need for this medication however does not feel that he can tolerate it.  Taking red yeast rice.    Recently seen in IC for infection of scalp wound.  Reports overall this is resolving while taking antibiotics.  He notes some consistent painful nodules behind his right ear and neck region.  Notes symptoms are constant and stable in severity.  Notes no associated fever, chills, discharge or redness.  ALLERGIES   Allergies[1]     CURRENT MEDICATIONS:     Current Outpatient Medications   Medication Sig Dispense Refill    Needle, Disp, (EASY TOUCH HYPODERMIC NEEDLE) 22G X 1-1/2\" Does not apply Misc USE NEEDLE TO INJECT EVERY 5 DAYS. 50 each 0    Syringe, Disposable, (BD SYRINGE LUER-MIHIR) 1 ML Does not apply Misc USE 1 SYRINGE  WITH NEEDLE EVERY 5 DAYS. 50 each 0    Needle, Disp, (BD HYPODERMIC NEEDLE) 18G X 1\" Does not apply Misc USE WITH SYRINGE TO DRAW UP EVERY 5 DAYS 50 each 0    aspirin 81 MG Oral Tab EC Take 1 tablet (81 mg total) by mouth daily. 30 tablet 0    clopidogrel 75 MG Oral Tab Take 1 tablet (75 mg total) by mouth daily. 30 tablet 0    losartan 25 MG Oral Tab Take 1 tablet (25 mg total) by mouth daily. 30 tablet 0    CUSTOM MEDICATION 22 gauge 1.5 inch needles   Use 1 needle every 5 days 30 each 0    CUSTOM MEDICATION Syringes with needle (0.5 or 1ml)  Use 1 syringe with needle every 5 days. 30 each 0    hydrOXYzine 25 MG Oral Tab Take 1 tablet (25 mg total) by mouth nightly as needed for Anxiety (sleep). 90 tablet 0    Glucose Blood (ACCU-CHEK GUIDE) In Vitro Strip Use 1 strip twice daily 200 strip 0    ALPRAZolam 0.5 MG Oral Tab Take 1 tablet (0.5 mg total) by mouth nightly as needed. 30 tablet 1    clotrimazole 1 % External Cream Apply 1 Application topically 2 (two) times daily. 28 g 0       MEDICAL HISTORY:     Past Medical History:    Anxiety    Diabetes (HCC)    Diet-controlled diabetes mellitus (HCC)    Guillain-Elma (HCC)    High cholesterol    Hyperlipidemia    Low testosterone    Non-alcoholic fatty liver disease    Tubular adenoma of colon    repeat CLN in 2030       SURGICAL HISTORY:     Past Surgical History:   Procedure Laterality Date    Angioplasty (coronary)  03/05/2024    Colonoscopy N/A 02/22/2023    Procedure: COLONOSCOPY;  Surgeon: SPENSER Flores MD;  Location: [facility] ENDO    Cyst removal      Hernia surgery         FAMILY HISTORY:      Family History   Problem Relation Age of Onset    Diabetes Mother     High Cholesterol Mother     High Cholesterol Father     Glaucoma Neg     Macular degeneration Neg        SOCIAL HISTORY:     Social History     Socioeconomic History    Marital status:    Tobacco Use    Smoking status: Former     Current packs/day: 0.00     Types: Cigarettes    Smokeless  tobacco: Never   Vaping Use    Vaping status: Never Used   Substance and Sexual Activity    Alcohol use: Never    Drug use: Yes     Types: Cannabis   Other Topics Concern    Special Diet No     Social Drivers of Health      [facility]    Social Connections    [facility]    Housing Stability       REVIEW OF SYSTEMS:   Review of Systems     See HPI for pertinent positives and negatives     PHYSICAL EXAM:   There were no vitals filed for this visit.    Physical Exam       Physical Exam  Constitutional:       Appearance: Normal appearance.   Neurological:      General: No focal deficit present.      Mental Status: She is alert and oriented to person, place, and time.   Psychiatric:         Mood and Affect: Mood normal.    ASSESSMENT AND PLAN:     Plan  1) Testosterone increase to 0.4ml from 0.35 ml   2) take magnesium glycinate every night for 12 weeks   3) sleep I do believe it is due to your nervous system being stuck in fight or flight   We discussed medication for anxiety  At this time I would recommend identifying triggers and working on sleep hygiene     Sound baths  Binaural beats or singing bowls  Apps  Youtube I prefer this   You want to use headphones  5-30 minutes a day             1. Low testosterone  - testosterone cypionate (Depo-Testosterone) 200 mg/mL IM injection 200 mg  - Estrogens Fractionated, Serum; Future  - Testosterone,Total and Weakly Bound w/ SHBG; Future  - CBC With Differential With Platelet; Future  - Comp Metabolic Panel (14); Future    2. Stress reaction    3. High cholesterol    4. Sleep concern      Time spent with patient: Over 30 minutes spent in chart review and in direct communication with patient obtaining and reviewing history, creating a unique care plan, explaining the rationale for treatment, reviewing potential SE and overall treatment plan,   documenting all clinical information in Epic. Over 50% of this time was in education, counseling and coordination of care.     No follow-ups on file.      Problem List Items Addressed This Visit    None  Visit Diagnoses       Low testosterone    -  Primary    Relevant Medications    testosterone cypionate (Depo-Testosterone) 200 mg/mL IM injection 200 mg    Other Relevant Orders    Estrogens Fractionated, Serum    Testosterone,Total and Weakly Bound w/ SHBG    CBC With Differential With Platelet    Comp Metabolic Panel (14)    Stress reaction        High cholesterol        Sleep concern                 Orders Placed This Visit:  Orders Placed This Encounter   Procedures    Estrogens Fractionated, Serum    Testosterone,Total and Weakly Bound w/ SHBG    CBC With Differential With Platelet    Comp Metabolic Panel (14)     No orders of the defined types were placed in this encounter.      Patient Instructions   Plan  1) Testosterone increase to 0.4ml from 0.35 ml   2) take magnesium glycinate every night for 12 weeks   3) sleep I do believe it is due to your nervous system being stuck in fight or flight   We discussed medication for anxiety  At this time I would recommend identifying triggers and working on sleep hygiene     Sound baths  Binaural beats or singing bowls  Apps  Youtube I prefer this   You want to use headphones  5-30 minutes a day             No follow-ups on file.    Patient affirmed understanding of plan and all questions were answered.     Shanell Juárez PA-C       [1] No Known Allergies

## 2024-12-17 ENCOUNTER — PATIENT MESSAGE (OUTPATIENT)
Dept: INTEGRATIVE MEDICINE | Facility: CLINIC | Age: 49
End: 2024-12-17

## 2024-12-26 NOTE — TELEPHONE ENCOUNTER
A refill request was received for:  Requested Prescriptions     Pending Prescriptions Disp Refills    ANASTROZOLE 1 MG Oral Tab tab [Pharmacy Med Name: Anastrozole 1 Mg Tab Brec] 6 tablet 0     Sig: TAKE one-HALF TABLET BY MOUTH WEEKLY     Last refill date:  9/4/24  Qty: 6 tablet and 0   Dx: BHRT  Last office visit:  12/16/24       Future Appointments   Date Time Provider Department Center   3/4/2025  9:45 AM Shanell Juárez PA-C [facility] [place]

## 2024-12-30 RX ORDER — ANASTROZOLE 1 MG/1
1 TABLET ORAL WEEKLY
Qty: 12 TABLET | Refills: 0 | Status: SHIPPED | OUTPATIENT
Start: 2024-12-30 | End: 2024-12-30

## 2024-12-30 RX ORDER — ANASTROZOLE 1 MG/1
TABLET ORAL
Qty: 6 TABLET | Refills: 0 | Status: SHIPPED | OUTPATIENT
Start: 2024-12-30

## 2024-12-30 NOTE — TELEPHONE ENCOUNTER
A refill request was received for:  Requested Prescriptions     Pending Prescriptions Disp Refills    anastrozole 1 MG Oral Tab tab  0     Sig: Take 1 tablet (1 mg total) by mouth daily.     Last refill date:  9/4/2024  Qty: 6 tablets and 0  Dx:   Last office visit: 12/16/2024  When is follow up due:       Future Appointments   Date Time Provider Department Center   3/4/2025  9:45 AM hSanell Juárez PA-C [facility] [place]

## 2025-01-01 ENCOUNTER — APPOINTMENT (OUTPATIENT)
Dept: FAMILY MEDICINE | Age: 50
End: 2025-01-01

## 2025-01-01 ENCOUNTER — TELEPHONE (OUTPATIENT)
Dept: FAMILY MEDICINE | Age: 50
End: 2025-01-01

## 2025-01-31 RX ORDER — LOSARTAN POTASSIUM 25 MG/1
25 TABLET ORAL DAILY
Qty: 90 TABLET | Refills: 0 | Status: SHIPPED | OUTPATIENT
Start: 2025-01-31

## 2025-01-31 RX ORDER — CLOPIDOGREL BISULFATE 75 MG/1
75 TABLET ORAL DAILY
Qty: 90 TABLET | Refills: 0 | Status: SHIPPED | OUTPATIENT
Start: 2025-01-31

## 2025-01-31 NOTE — TELEPHONE ENCOUNTER
RN spoke with patient. Patient is still following up with cardiology, however, he would like Dr. Hu to refill his medications for convenience .     Please advise if cardiology needs to refill rx. Thanks

## 2025-02-07 DIAGNOSIS — R79.89 LOW TESTOSTERONE: ICD-10-CM

## 2025-02-07 RX ORDER — NEEDLES, DISPOSABLE 22GX1 1/2"
NEEDLE, DISPOSABLE MISCELLANEOUS
Qty: 50 EACH | Refills: 0 | Status: SHIPPED | OUTPATIENT
Start: 2025-02-07

## 2025-02-07 RX ORDER — NEEDLES, DISPOSABLE 25GX5/8"
NEEDLE, DISPOSABLE MISCELLANEOUS
Qty: 50 EACH | Refills: 0 | Status: SHIPPED | OUTPATIENT
Start: 2025-02-07

## 2025-02-07 RX ORDER — TESTOSTERONE CYPIONATE 200 MG/ML
INJECTION, SOLUTION INTRAMUSCULAR
Qty: 6 ML | Refills: 0 | OUTPATIENT
Start: 2025-02-07

## 2025-02-07 RX ORDER — SYRINGE, DISPOSABLE, 1 ML
SYRINGE, EMPTY DISPOSABLE MISCELLANEOUS
Qty: 50 EACH | Refills: 0 | Status: SHIPPED | OUTPATIENT
Start: 2025-02-07

## 2025-02-07 NOTE — TELEPHONE ENCOUNTER
A refill request was received for:  Requested Prescriptions     Pending Prescriptions Disp Refills    Needle, Disp, (EASY TOUCH HYPODERMIC NEEDLE) 22G X 1-1/2\" Does not apply Misc 50 each 0    Syringe, Disposable, (BD SYRINGE LUER-MIHIR) 1 ML Does not apply Misc 50 each 0     Sig: USE 1 SYRINGE WITH NEEDLE EVERY 5 DAYS.    Needle, Disp, (BD HYPODERMIC NEEDLE) 18G X 1\" Does not apply Misc 50 each 0     Last refill date:  12/3/24   Dx: BHRT    Future Appointments   Date Time Provider Department Center   3/4/2025  9:45 AM Shanell Juárez PA-C [facility] [place]

## 2025-02-07 NOTE — TELEPHONE ENCOUNTER
Patient confirmed he is on 0.4 dosage, he received a message from the pharmacy that refill was denied. He is currently out of medication, his last dose was yesterday.       Thank you

## 2025-02-11 RX ORDER — TESTOSTERONE CYPIONATE 200 MG/ML
INJECTION, SOLUTION INTRAMUSCULAR
Qty: 8 ML | Refills: 0 | Status: SHIPPED | OUTPATIENT
Start: 2025-02-11

## 2025-02-11 RX ORDER — TESTOSTERONE CYPIONATE 200 MG/ML
200 INJECTION, SOLUTION INTRAMUSCULAR ONCE
Status: SHIPPED | OUTPATIENT
Start: 2025-02-11

## 2025-02-11 RX ORDER — TESTOSTERONE CYPIONATE 200 MG/ML
200 INJECTION, SOLUTION INTRAMUSCULAR ONCE
Status: DISCONTINUED | OUTPATIENT
Start: 2025-02-11 | End: 2025-02-11

## 2025-02-20 RX ORDER — LOSARTAN POTASSIUM 25 MG/1
25 TABLET ORAL DAILY
Qty: 90 TABLET | Refills: 0 | OUTPATIENT
Start: 2025-02-20

## 2025-02-24 ENCOUNTER — NURSE TRIAGE (OUTPATIENT)
Dept: INTEGRATIVE MEDICINE | Facility: CLINIC | Age: 50
End: 2025-02-24

## 2025-02-24 NOTE — TELEPHONE ENCOUNTER
Pt stated he has been sick for the past month and did not take testosterone for 5 days. Pt questioned if he should reschedule current appointment for March 4th since he has been sick and missed 5 days of testosterone. Pt also requested to speak with a nurse regarding yellow mucus.

## 2025-02-24 NOTE — TELEPHONE ENCOUNTER
RN spoke with patient.     Patient's son had influenza. Patient was exposed.      Patient has been coughing and not feeling well. Patient had a fever. Fever stopped on Thursday.     Patient is coughing up yellow mucus yesterday and this morning. Patient's cough \"is harsh\".     Patient has had symptoms for approx two weeks. Patient feels some chest tightness and wheezing when he coughs. Denies difficulty breathing. Denies chest pain when he not coughing.     Patient advised to proceed to [facility] for assessment and evaluation of symptoms. Patient voiced understanding.         Reason for Disposition   Patient sounds very sick or weak to the triager   Wheezing is present    Protocols used: Cough-A-OH

## 2025-03-04 ENCOUNTER — TELEPHONE (OUTPATIENT)
Dept: INTEGRATIVE MEDICINE | Facility: CLINIC | Age: 50
End: 2025-03-04

## 2025-03-04 ENCOUNTER — PATIENT MESSAGE (OUTPATIENT)
Dept: INTEGRATIVE MEDICINE | Facility: CLINIC | Age: 50
End: 2025-03-04

## 2025-03-04 DIAGNOSIS — R79.89 LOW TESTOSTERONE: ICD-10-CM

## 2025-03-04 RX ORDER — ANASTROZOLE 1 MG/1
0.5 TABLET ORAL WEEKLY
Qty: 6 TABLET | Refills: 0 | Status: SHIPPED | OUTPATIENT
Start: 2025-03-04 | End: 2025-06-02

## 2025-03-04 RX ORDER — ANASTROZOLE 1 MG/1
TABLET ORAL
Qty: 6 TABLET | Refills: 0 | OUTPATIENT
Start: 2025-03-04

## 2025-03-04 NOTE — TELEPHONE ENCOUNTER
A refill request was received for:  Requested Prescriptions     Pending Prescriptions Disp Refills    anastrozole 1 MG Oral Tab tab 6 tablet 0     Last refill date:  12/30/2024  Qty: 6 tablets and 0  Dx:   Last office visit: 12/16/2024   When is follow up due: None listed in notes    Future Appointments   Date Time Provider Department Center   5/13/2025  9:45 AM Shanell Juárez PA-C [facility] [place]

## 2025-03-04 NOTE — TELEPHONE ENCOUNTER
RN spoke with patient's pharmacy regarding testosterone.     Per pharmacy, patient is using single dose vials as multi dose vials. Patient should be using medication as single dose vials and wasting remaining medication.     New order pended, per pharmacist, patient needs to use vials as single dose and discard remaining medication. Note also needs to be on rx that it is ok to dispense early.     Order pended, thanks!

## 2025-03-05 RX ORDER — TESTOSTERONE CYPIONATE 200 MG/ML
INJECTION, SOLUTION INTRAMUSCULAR
Qty: 6 ML | Refills: 0 | Status: SHIPPED | OUTPATIENT
Start: 2025-03-05

## 2025-03-20 ENCOUNTER — LAB ENCOUNTER (OUTPATIENT)
Dept: LAB | Facility: REFERENCE LAB | Age: 50
End: 2025-03-20
Attending: PHYSICIAN ASSISTANT
Payer: COMMERCIAL

## 2025-03-20 DIAGNOSIS — R79.89 LOW TESTOSTERONE: ICD-10-CM

## 2025-03-20 LAB
ALBUMIN SERPL-MCNC: 4.8 G/DL (ref 3.2–4.8)
ALBUMIN/GLOB SERPL: 2 {RATIO} (ref 1–2)
ALP LIVER SERPL-CCNC: 71 U/L
ALT SERPL-CCNC: 18 U/L
ANION GAP SERPL CALC-SCNC: 7 MMOL/L (ref 0–18)
AST SERPL-CCNC: 21 U/L (ref ?–34)
BASOPHILS # BLD AUTO: 0.04 X10(3) UL (ref 0–0.2)
BASOPHILS NFR BLD AUTO: 0.6 %
BILIRUB SERPL-MCNC: 1 MG/DL (ref 0.3–1.2)
BUN BLD-MCNC: 11 MG/DL (ref 9–23)
BUN/CREAT SERPL: 9.4 (ref 10–20)
CALCIUM BLD-MCNC: 9.9 MG/DL (ref 8.7–10.4)
CHLORIDE SERPL-SCNC: 108 MMOL/L (ref 98–112)
CO2 SERPL-SCNC: 23 MMOL/L (ref 21–32)
CREAT BLD-MCNC: 1.17 MG/DL
DEPRECATED RDW RBC AUTO: 40.4 FL (ref 35.1–46.3)
EGFRCR SERPLBLD CKD-EPI 2021: 76 ML/MIN/1.73M2 (ref 60–?)
EOSINOPHIL # BLD AUTO: 0.09 X10(3) UL (ref 0–0.7)
EOSINOPHIL NFR BLD AUTO: 1.3 %
ERYTHROCYTE [DISTWIDTH] IN BLOOD BY AUTOMATED COUNT: 13 % (ref 11–15)
FASTING STATUS PATIENT QL REPORTED: YES
GLOBULIN PLAS-MCNC: 2.4 G/DL (ref 2–3.5)
GLUCOSE BLD-MCNC: 107 MG/DL (ref 70–99)
HCT VFR BLD AUTO: 49.7 %
HGB BLD-MCNC: 17.3 G/DL
IMM GRANULOCYTES # BLD AUTO: 0.04 X10(3) UL (ref 0–1)
IMM GRANULOCYTES NFR BLD: 0.6 %
LYMPHOCYTES # BLD AUTO: 1.45 X10(3) UL (ref 1–4)
LYMPHOCYTES NFR BLD AUTO: 21.2 %
MCH RBC QN AUTO: 29.6 PG (ref 26–34)
MCHC RBC AUTO-ENTMCNC: 34.8 G/DL (ref 31–37)
MCV RBC AUTO: 85 FL
MONOCYTES # BLD AUTO: 0.5 X10(3) UL (ref 0.1–1)
MONOCYTES NFR BLD AUTO: 7.3 %
NEUTROPHILS # BLD AUTO: 4.73 X10 (3) UL (ref 1.5–7.7)
NEUTROPHILS # BLD AUTO: 4.73 X10(3) UL (ref 1.5–7.7)
NEUTROPHILS NFR BLD AUTO: 69 %
OSMOLALITY SERPL CALC.SUM OF ELEC: 286 MOSM/KG (ref 275–295)
PLATELET # BLD AUTO: 256 10(3)UL (ref 150–450)
POTASSIUM SERPL-SCNC: 3.9 MMOL/L (ref 3.5–5.1)
PROT SERPL-MCNC: 7.2 G/DL (ref 5.7–8.2)
RBC # BLD AUTO: 5.85 X10(6)UL
SODIUM SERPL-SCNC: 138 MMOL/L (ref 136–145)
WBC # BLD AUTO: 6.9 X10(3) UL (ref 4–11)

## 2025-03-20 PROCEDURE — 80053 COMPREHEN METABOLIC PANEL: CPT

## 2025-03-20 PROCEDURE — 85025 COMPLETE CBC W/AUTO DIFF WBC: CPT

## 2025-03-20 PROCEDURE — 82671 ASSAY OF ESTROGENS: CPT

## 2025-03-20 PROCEDURE — 84410 TESTOSTERONE BIOAVAILABLE: CPT

## 2025-03-20 PROCEDURE — 36415 COLL VENOUS BLD VENIPUNCTURE: CPT

## 2025-03-24 DIAGNOSIS — R79.89 LOW TESTOSTERONE: ICD-10-CM

## 2025-03-24 LAB
ESTRADIOL: 12.8 PG/ML
ESTRONE: 44 PG/ML

## 2025-03-24 RX ORDER — CLOPIDOGREL BISULFATE 75 MG/1
75 TABLET ORAL DAILY
Qty: 90 TABLET | Refills: 0 | Status: SHIPPED | OUTPATIENT
Start: 2025-03-24

## 2025-03-24 RX ORDER — LOSARTAN POTASSIUM 25 MG/1
25 TABLET ORAL DAILY
Qty: 90 TABLET | Refills: 0 | Status: SHIPPED | OUTPATIENT
Start: 2025-03-24

## 2025-03-24 RX ORDER — TESTOSTERONE CYPIONATE 200 MG/ML
INJECTION, SOLUTION INTRAMUSCULAR
Qty: 6 ML | Refills: 0 | OUTPATIENT
Start: 2025-03-24

## 2025-03-24 NOTE — TELEPHONE ENCOUNTER
A refill request was received for:  Requested Prescriptions     Pending Prescriptions Disp Refills    testosterone cypionate 200 mg/mL Intramuscular Solution 6 mL 0     Sig: Testosterone 0.4ml every 5 days Each vial is single use only, please discard remaining medication in vial per pharmacist instructions     Last refill date:  3/5/2025  Qty: 6 mL and 0  Dx: low testosterone  Last office visit: 12/16/2024  When is follow up due: None listed in notes      Future Appointments   Date Time Provider Department Center   3/27/2025 11:15 AM Shanell Juárez PA-C [facility] [place]

## 2025-03-24 NOTE — TELEPHONE ENCOUNTER
A refill request was received for:  Requested Prescriptions     Pending Prescriptions Disp Refills    clopidogrel 75 MG Oral Tab 90 tablet 0     Sig: Take 1 tablet (75 mg total) by mouth daily.    losartan 25 MG Oral Tab 90 tablet 0     Sig: Take 1 tablet (25 mg total) by mouth daily.     Last refill date:  Clopidogrel - 1/31/2025  Losartan - 1/31/2025  Qty: Both - 90 capsules and 0  Dx: hypertension  Last office visit: 5/7/2024  When is follow up due: None listed in notes      Future Appointments   Date Time Provider Department Center   3/27/2025 11:15 AM Shanell Juárez PA-C [facility] [place]

## 2025-03-25 RX ORDER — TESTOSTERONE CYPIONATE 200 MG/ML
INJECTION, SOLUTION INTRAMUSCULAR
Qty: 6 ML | Refills: 0 | Status: SHIPPED | OUTPATIENT
Start: 2025-03-25

## 2025-03-26 LAB
SEX HORM BIND GLOB: 21.2 NMOL/L
TESTOST % FREE+WEAK BND: 31.3 %
TESTOST FREE+WEAK BND: 248.1 NG/DL
TESTOSTERONE TOT /MS: 792.7 NG/DL

## 2025-03-27 ENCOUNTER — OFFICE VISIT (OUTPATIENT)
Dept: INTEGRATIVE MEDICINE | Facility: CLINIC | Age: 50
End: 2025-03-27
Payer: COMMERCIAL

## 2025-03-27 VITALS
SYSTOLIC BLOOD PRESSURE: 124 MMHG | DIASTOLIC BLOOD PRESSURE: 62 MMHG | OXYGEN SATURATION: 98 % | BODY MASS INDEX: 23.43 KG/M2 | HEIGHT: 72 IN | WEIGHT: 173 LBS | HEART RATE: 78 BPM

## 2025-03-27 DIAGNOSIS — Z76.89 SLEEP CONCERN: ICD-10-CM

## 2025-03-27 DIAGNOSIS — E78.00 HIGH CHOLESTEROL: ICD-10-CM

## 2025-03-27 DIAGNOSIS — R79.89 LOW TESTOSTERONE: Primary | ICD-10-CM

## 2025-03-27 PROCEDURE — 99214 OFFICE O/P EST MOD 30 MIN: CPT | Performed by: PHYSICIAN ASSISTANT

## 2025-03-27 NOTE — PATIENT INSTRUCTIONS
Plan   1) testosterone at the same dose  2) continue anastrozole to every 2 weeks   Nipple tenderness can be due to elevation in estrogen     3) sleep - magnesium glycinate at bedtime to help with sleep   Stress start to rise - holy basil calming     Continue exercise regularly to help manage stress and pain         Provides great clarity in understanding what happens to the brain and body with the onset of trauma/illness      I would recommend getting back into yoga  HALEY Gallo

## 2025-03-27 NOTE — PROGRESS NOTES
Rio Johnson is a 49 year old male.  Chief Complaint   Patient presents with    Follow - Up     Hormones        HPI:   Rio presents for follow up on hormones and blood sugar     Updates from last visit:     Testosterone was in a good range.   Estrogen is in a good range  Red blood cells are slightly elevated   Hematocrit is normal       Wt Readings from Last 6 Encounters:   03/27/25 173 lb (78.5 kg)   08/08/24 172 lb (78 kg)   05/24/24 172 lb 9.6 oz (78.3 kg)   05/07/24 173 lb 12.8 oz (78.8 kg)   01/17/24 171 lb 6.4 oz (77.7 kg)   12/08/23 167 lb 9.6 oz (76 kg)       He continues to have pain due to guillian barre syndrome   When he     Labs  Component      Latest Ref Rng 11/21/2024 3/20/2025   WBC      4.0 - 11.0 x10(3) uL 5.8  6.9    RBC      4.30 - 5.70 x10(6)uL 5.62  5.85 (H)    Hemoglobin      13.0 - 17.5 g/dL 16.6  17.3    Hematocrit      39.0 - 53.0 % 49.5  49.7    MCV      80.0 - 100.0 fL 88.1  85.0    MCH      26.0 - 34.0 pg 29.5  29.6    MCHC      31.0 - 37.0 g/dL 33.5  34.8    RDW-SD      35.1 - 46.3 fL  40.4    RDW      11.0 - 15.0 % 12.4  13.0    Platelet Count      150.0 - 450.0 10(3)uL 239  256.0    Prelim Neutrophil Abs      1.50 - 7.70 x10 (3) uL  4.73    Neutrophils Absolute      1.50 - 7.70 x10(3) uL 3,329  4.73    Lymphocytes Absolute      1.00 - 4.00 x10(3) uL 1,694  1.45    Monocytes Absolute      0.10 - 1.00 x10(3) uL 609  0.50    Eosinophils Absolute      0.00 - 0.70 x10(3) uL 110  0.09    Basophils Absolute      0.00 - 0.20 x10(3) uL 58  0.04    Immature Granulocyte Absolute      0.00 - 1.00 x10(3) uL  0.04    Neutrophils %      % 57.4  69.0    Lymphocytes %      % 29.2  21.2    Monocytes %      % 10.5  7.3    Eosinophils %      % 1.9  1.3    Basophils %      % 1.0  0.6    Immature Granulocyte %      %  0.6    Glucose      70 - 99 mg/dL 99  107 (H)    Sodium      136 - 145 mmol/L 141  138    Potassium      3.5 - 5.1 mmol/L 4.3  3.9    Chloride      98 - 112 mmol/L 105  108     Carbon Dioxide, Total      21.0 - 32.0 mmol/L 30  23.0    ANION GAP      0 - 18 mmol/L  7    BUN      9 - 23 mg/dL 15  11    CREATININE      0.70 - 1.30 mg/dL 1.13  1.17    BUN/CREATININE RATIO      10.0 - 20.0  SEE NOTE:  9.4 (L)    CALCIUM      8.7 - 10.4 mg/dL 9.3  9.9    CALCULATED OSMOLALITY      275 - 295 mOsm/kg  286    EGFR      >=60 mL/min/1.73m2 79  76    ALT (SGPT)      10 - 49 U/L 23  18    AST (SGOT)      <34 U/L 19  21    ALKALINE PHOSPHATASE      45 - 117 U/L  71    Total Bilirubin      0.3 - 1.2 mg/dL 0.7  1.0    PROTEIN, TOTAL      5.7 - 8.2 g/dL 6.8  7.2    Albumin      3.2 - 4.8 g/dL 4.2  4.8    Globulin      2.0 - 3.5 g/dL 2.6  2.4    A/G Ratio      1.0 - 2.0  1.6  2.0    Patient Fasting for CMP?  Yes    MPV      7.5 - 12.5 fL 11.4     Alkaline Phosphatase      35 - 144 U/L 61     Testosterone Tot LC/MS      264.0 - 916.0 ng/dL  792.7    Testost % Free+Weak Bnd      9.0 - 46.0 %  31.3    Testost Free+Weak Bnd      40.0 - 250.0 ng/dL  248.1    Sex Horm Bind Glob      19.3 - 76.4 nmol/L  21.2    TESTOSTERONE, TOTAL,$LC/MS/MS      250 - 1,100 ng/dL 654     FREE TESTOSTERONE      35.0 - 155.0 pg/mL 161.2 (H)     ESTRONE      0 - 174 pg/mL  44    Estradiol      7.6 - 42.6 pg/mL 25  12.8    TGF BETA, PLASMA      0 - 22,062 pg/mL 3,080        Legend:  (H) High  (L) Low    Component      Latest Ref Rng 5/23/2024 7/4/2024   ALLERGEN A. ALTERNATA (S)      <0.10 kUA/L <0.10     ASPERGILLUS FUMIGATUS      <0.10 kUA/L <0.10     ALLERGEN BERMUDA GRASS (S)      <0.10 kUA/L <0.10     ALLERGEN BIRCH TREE (S)      <0.10 kUA/L <0.10     ALLERGEN BOX ELDER (S)      <0.10 kUA/L <0.10     ALLERGEN C. HERBARUM (S)      <0.10 kUA/L <0.10     ALLERGEN CAT DANDER (S)      <0.10 kUA/L <0.10     ALLERGEN COCKROACH (S)      <0.10 kUA/L <0.10     ALLERGEN COMMON PIGWEED (S)      <0.10 kUA/L <0.10     ALLERGEN COMMON RAGWEED (S)      <0.10 kUA/L <0.10     ALLERGEN COTTONWOOD TREE (S)      <0.10 kUA/L <0.10     ALLERGEN D.  FARINAE (S)      <0.10 kUA/L <0.10     ALLERGEN D.PTERONYSSINUS (S)      <0.10 kUA/L <0.10     ALLERGEN DOG DANDER (S)      <0.10 kUA/L <0.10     ALLERGEN ELM TREE (S)      <0.10 kUA/L <0.10     ALLERGEN MARSH ELDER (S)      <0.10 kUA/L <0.10     ALLERGEN MOUSE EPITHELIUM (S)      <0.10 kUA/L <0.10     ALLERGEN MTN CEDAR (S)      <0.10 kUA/L <0.10     ALLERGEN MUCOR RACEMOSUS (S)      <0.10 kUA/L <0.10     ALLERGEN MULBERRY (S)      <0.10 kUA/L <0.10     ALLERGEN OAK TREE (S)      <0.10 kUA/L <0.10     ALLERGEN PECAN/HICKORY (S)      <0.10 kUA/L <0.10     ALLERGEN PENICILLIUM NOTATUM      <0.10 kUA/L <0.10     ALLERGEN Malian THISTLE (S)      <0.10 kUA/L <0.10     ALLERGEN SYCAMORE (S)      <0.10 kUA/L <0.10     ALLERGEN DAVID GRASS (S)      <0.10 kUA/L <0.10     ALLERGEN WALNUT TREE (S)      <0.10 kUA/L <0.10     ALLERGEN WHITE DHEERAJ (S)      <0.10 kUA/L <0.10     IMMUNOGLOBULIN E      2.00 - 214.00 kU/L 37.70     WBC      4.0 - 11.0 x10(3) uL 6.1     RBC      4.30 - 5.70 x10(6)uL 5.52     Hemoglobin      13.0 - 17.5 g/dL 16.2     Hematocrit      39.0 - 53.0 % 47.7     MCV      80.0 - 100.0 fL 86.4     MCH      26.0 - 34.0 pg 29.3     MCHC      31.0 - 37.0 g/dL 34.0     RDW-SD      35.1 - 46.3 fL 37.2     RDW      11.0 - 15.0 % 11.8     Platelet Count      150.0 - 450.0 10(3)uL 266.0     Prelim Neutrophil Abs      1.50 - 7.70 x10 (3) uL 3.65     Neutrophils Absolute      1.50 - 7.70 x10(3) uL 3.65     Lymphocytes Absolute      1.00 - 4.00 x10(3) uL 1.59     Monocytes Absolute      0.10 - 1.00 x10(3) uL 0.57     Eosinophils Absolute      0.00 - 0.70 x10(3) uL 0.17     Basophils Absolute      0.00 - 0.20 x10(3) uL 0.07     Immature Granulocyte Absolute      0.00 - 1.00 x10(3) uL 0.06     Neutrophils %      % 59.8     Lymphocytes %      % 26.0     Monocytes %      % 9.3     Eosinophils %      % 2.8     Basophils %      % 1.1     Immature Granulocyte %      % 1.0     Glucose      70 - 99 mg/dL 97     Sodium      136 -  145 mmol/L 142     Potassium      3.5 - 5.1 mmol/L 4.3     Chloride      98 - 112 mmol/L 108     Carbon Dioxide, Total      21.0 - 32.0 mmol/L 28.0     ANION GAP      0 - 18 mmol/L 6     BUN      9 - 23 mg/dL 19     CREATININE      0.70 - 1.30 mg/dL 1.05     BUN/CREATININE RATIO      10.0 - 20.0  18.1     CALCIUM      8.7 - 10.4 mg/dL 9.2     CALCULATED OSMOLALITY      275 - 295 mOsm/kg 296 (H)     EGFR      >=60 mL/min/1.73m2 87     ALT (SGPT)      10 - 49 U/L 38     AST (SGOT)      <=34 U/L 25     ALKALINE PHOSPHATASE      45 - 117 U/L 55     Total Bilirubin      0.3 - 1.2 mg/dL 0.6     PROTEIN, TOTAL      5.7 - 8.2 g/dL 6.7     Albumin      3.2 - 4.8 g/dL 4.2     Globulin      2.0 - 3.5 g/dL 2.5     A/G Ratio      1.0 - 2.0  1.7     Patient Fasting for CMP? Yes     Cholesterol, Total      <200 mg/dL 158     HDL Cholesterol      40 - 59 mg/dL 38 (L)     Triglycerides      30 - 149 mg/dL 190 (H)     LDL Cholesterol Calc      <100 mg/dL 87     VLDL      0 - 30 mg/dL 31 (H)     NON-HDL CHOLESTEROL      <130 mg/dL 120     Patient Fasting for Lipid? Yes     Testosterone Tot LC/MS      264.0 - 916.0 ng/dL 1019.1 (H)  523.0    Testost % Free+Weak Bnd      9.0 - 46.0 % 29.2  18.5    Testost Free+Weak Bnd      40.0 - 250.0 ng/dL 297.6 (H)  96.8    Sex Horm Bind Glob      16.5 - 55.9 nmol/L 26.7  41.7    HEMOGLOBIN A1c      <5.7 % 5.5     ESTIMATED AVERAGE GLUCOSE      68 - 126 mg/dL 111     VITAMIN D, 25-OH, TOTAL      30.0 - 100.0 ng/mL 40.1     T4,Free (Direct)      0.8 - 1.7 ng/dL 1.4     Estradiol      <=39.8 pg/mL 45.8 (H)  24.8    TSH      0.550 - 4.780 mIU/mL 1.142     PSA Screen      <=4.00  1.03     Prolactin      2.1 - 17.7 ng/mL 6.6     HOMOCYSTEINE      3.7 - 13.9 umol/L 10.9     GAMMA GLUTAMYL TRANSFERASE      <73 U/L 26     FSH      1.4 - 18.1 mIU/mL <0.3 (L)     T3 FREE      2.40 - 4.20 pg/mL 3.02     Cortisol      ug/dL 17.2     DHEA SULFATE      34.5 - 568.9 ug/dL 79.5     C-REACTIVE PROTEIN      <1.00  mg/dL <0.40        Legend:  (H) High  (L) Low      HPI's FROM PREVIOUS VISITS      Rio presents for follow up on labs    Updates from last visit:   TGF - beta   Was 13,000 and now it is down to 3,000   He was having a lot of chronic fatigue and pain. He feels the statins were making him feel worse. Cognitively and neuropathy were worse     Cholestyramine - He is currently on it a week ago. He is also taking the niacine  Some loose stools with medicine     He will do the blood test the day of the shot.     Body/skin:   He is doing PT because he hurt his hip     Hormones -       Sleep -   He has a hard time with sleep. He is waking up 3 oclock, 330.       Supplements:   Coq10  Phosphotidyl serine  Rodiola   L theanine       HPI's FROM PREVIOUS VISITS      Rio presents for follow up,     Updates from last visit: hormones, cholesterol, possible mold toxin exposure     Body/skin:   Went to the ER due to high blood pressure.     Cardiologist - stopped the metoprolol.  He is on cholesterol medication. He wants him to be below 50 LDL   He is not sure if the rosuvastatin is affecting his pain in his back and neuropathy  He is doing exercises which is helping     No event just stent placement     Hormones -   When his hormones were high   He was low in the 7/4/24   He does not feel as great as when he was higher dose  When his Testosterone was higher he was not more irritable         Lifestyle Factors affecting health:   Diet -   He tries to stay away from carbs.     Supplements: Red yeast rice with co q 10 (stopped red rice yeast Coq 10 alone)   Policasonol 80mg - stopped   Berberine 500mg 1-2 times a day   Fish oil - ultra EPA concentrate - He is taking a high EPA oil - cardiologist is trying to get rx epa   L theanine for stress   Rodiola for stress   THC as needed   Mitochor -  Vitamin A d and k pill - stopped (pure with D3+ k2)   Cortisol manager sometimes in the evening. - stopped   Phosphatidyl choline - no  improvement yet with neuro pain       HPI's FROM PREVIOUS VISITS      Rio presents for follow up,     Updates from last visit:   He had a calcium score that was high. He went to a cardiologist. 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 He had two stents place.     Surgery was March 10th. They started metoprolol and losartan daily. He is on crestor     Thyroid: numbers are looking good with TSH and T4     Body/skin:   He feels he is having low back and leg pain. He started taking crestor every other day.   He follows up on cardiology in July. They will look at removing BP medication     He has more nasal congestion and coughing. He is taking zyrtec that makes him sleeping     Mental State: he stopped prozac and was switched to hydroxyzine as needed. It helps him sleep.     Sleep - Hydroxyzine helps with his sleep. 1-2 times a week he needs medication.    Marriage is hard  Child is 2.5   Pressure with work and school. When he feels like he needs to study and cannot he will get more stressed        HPI's FROM PREVIOUS VISITS    Rio presents for follow up,     He has been on cholestryramine for cholesterol in the past. He was having a hard time tolerating it. He is now taking supplements     For stress he has tried Wellbutrin     Hormones - He is on testosterone 0.35ml every 5 days. No libido or erection issues. He was on hormones for IVF.     GI - no concerns       Lifestyle Factors affecting health:   Diet - He tried to do low carb, low sugar intermittent fasting. He will eat sour dough bread. He has fallen off for the holidays and trying to get back on. He will have high good fats. Avoids seed oils. He drops weight quickly and will get groggy.     Morning - pink sea salt and water  Hummus, cheese sourdough bread.     Exercise -He gos through phases. Where he is going to the gym a few times a week.      Stress - work and marriage stress. He has a young child. Stress and anxiety worsens his sleep. Working  out and avoiding stimulants help.     He is taking business classes    He is struggling with what is happening in [place]     Sleep - He has a racing mind when he tries to sleep. He takes magnesium glycinate and glycine at night and l theanine.   When he has a racing mind he will take xanax. He will do that once a week max if he is not getting sleep.     He can fall asleep when he is really tired but then he wakes up 4 hours later.     He had Guillain-Barré syndrome and has residual fatigue and pain. He burns out fast.   He will take a amino acid drink and pre work out to help physical and mental boost.       HPI:     49 yo present for follow up.     Off Bupropion for 2 months and notes overall doing well. Felt to many side effects with increased increased.  Notes overall marriage stress had reduced.     Having increased stress around conflict is [place]. Feels as though he is in a constant states of stress and worry.  Reports he is having nightmares and having frequent nighttime awakenings with panic.    Using alprazolam once a week at this time.  Feels as though he could use it more however has fear around brother with addiction.    Stop taking rosuvastatin at this time.  Understands need for this medication however does not feel that he can tolerate it.  Taking red yeast rice.    Recently seen in IC for infection of scalp wound.  Reports overall this is resolving while taking antibiotics.  He notes some consistent painful nodules behind his right ear and neck region.  Notes symptoms are constant and stable in severity.  Notes no associated fever, chills, discharge or redness.    ALLERGIES   Allergies[1]     CURRENT MEDICATIONS:     Current Outpatient Medications   Medication Sig Dispense Refill    testosterone cypionate 200 mg/mL Intramuscular Solution Testosterone 0.4ml every 5 days Each vial is single use only, please discard remaining medication in vial per pharmacist instructions 6 mL 0    clopidogrel 75  MG Oral Tab Take 1 tablet (75 mg total) by mouth daily. 90 tablet 0    losartan 25 MG Oral Tab Take 1 tablet (25 mg total) by mouth daily. 90 tablet 0    anastrozole 1 MG Oral Tab tab Take 0.5 tablets (0.5 mg total) by mouth once a week. TAKE one-HALF TABLET BY MOUTH WEEKLY 6 tablet 0    Needle, Disp, (EASY TOUCH HYPODERMIC NEEDLE) 22G X 1-1/2\" Does not apply Misc Use need for testosterone injection 50 each 0    Syringe, Disposable, (BD SYRINGE LUER-MIHIR) 1 ML Does not apply Misc USE 1 SYRINGE WITH NEEDLE EVERY 5 DAYS. 50 each 0    Needle, Disp, (BD HYPODERMIC NEEDLE) 18G X 1\" Does not apply Misc Use needles 2 per week for testosterone injection 50 each 0    aspirin 81 MG Oral Tab EC Take 1 tablet (81 mg total) by mouth daily. 30 tablet 0    CUSTOM MEDICATION 22 gauge 1.5 inch needles   Use 1 needle every 5 days 30 each 0    CUSTOM MEDICATION Syringes with needle (0.5 or 1ml)  Use 1 syringe with needle every 5 days. 30 each 0    hydrOXYzine 25 MG Oral Tab Take 1 tablet (25 mg total) by mouth nightly as needed for Anxiety (sleep). 90 tablet 0    ALPRAZolam 0.5 MG Oral Tab Take 1 tablet (0.5 mg total) by mouth nightly as needed. 30 tablet 1    clotrimazole 1 % External Cream Apply 1 Application topically 2 (two) times daily. 28 g 0       MEDICAL HISTORY:     Past Medical History:    Anxiety    Diabetes (HCC)    Diet-controlled diabetes mellitus (HCC)    Guillain-[place] (HCC)    High cholesterol    Hyperlipidemia    Low testosterone    Non-alcoholic fatty liver disease    Tubular adenoma of colon    repeat CLN in 2030       SURGICAL HISTORY:     Past Surgical History:   Procedure Laterality Date    Angioplasty (coronary)  03/05/2024    Colonoscopy N/A 02/22/2023    Procedure: COLONOSCOPY;  Surgeon: SPENSER Flores MD;  [facility] ENDO    Cyst removal      Hernia surgery         FAMILY HISTORY:      Family History   Problem Relation Age of Onset    Diabetes Mother     High Cholesterol Mother     High Cholesterol  Father     Glaucoma Neg     Macular degeneration Neg        SOCIAL HISTORY:     Social History     Socioeconomic History    Marital status:    Tobacco Use    Smoking status: Former     Current packs/day: 0.00     Types: Cigarettes    Smokeless tobacco: Never   Vaping Use    Vaping status: Never Used   Substance and Sexual Activity    Alcohol use: Never    Drug use: Yes     Types: Cannabis   Other Topics Concern    Special Diet No     Social Drivers of Health      [facility]    Housing Stability       REVIEW OF SYSTEMS:   Review of Systems     See HPI for pertinent positives and negatives     PHYSICAL EXAM:     Vitals:    03/27/25 1113   BP: 124/62   BP Location: Right arm   Patient Position: Sitting   Cuff Size: adult   Pulse: 78   SpO2: 98%   Weight: 173 lb (78.5 kg)   Height: 6' (1.829 m)       Physical Exam     Physical Exam  Constitutional:       Appearance: Normal appearance.   Neurological:      General: No focal deficit present.      Mental Status: She is alert and oriented to person, place, and time.   Psychiatric:         Mood and Affect: Mood normal.    ASSESSMENT AND PLAN:     Plan   1) testosterone at the same dose  2) continue anastrozole to every 2 weeks   Nipple tenderness can be due to elevation in estrogen     3) sleep - magnesium glycinate at bedtime to help with sleep   Stress start to rise - holy basil calming     Continue exercise regularly to help manage stress and pain       1. Low testosterone    2. High cholesterol    3. Sleep concern      Time spent with patient: Over 35 minutes spent in chart review and in direct communication with patient obtaining and reviewing history, creating a unique care plan, explaining the rationale for treatment, reviewing potential SE and overall treatment plan,  documenting all clinical information in Epic. Over 50% of this time was in education, counseling and coordination of care.     No  follow-ups on file.      Problem List Items Addressed This Visit    None  Visit Diagnoses       Low testosterone    -  Primary    High cholesterol        Sleep concern                 Orders Placed This Visit:  No orders of the defined types were placed in this encounter.    No orders of the defined types were placed in this encounter.      Patient Instructions   Plan   1) testosterone at the same dose  2) continue anastrozole to every 2 weeks   Nipple tenderness can be due to elevation in estrogen     3) sleep - magnesium glycinate at bedtime to help with sleep   Stress start to rise - holy basil calming     Continue exercise regularly to help manage stress and pain         Provides great clarity in understanding what happens to the brain and body with the onset of trauma/illness      I would recommend getting back into yoga  XI gong   Reiki     No follow-ups on file.    Patient affirmed understanding of plan and all questions were answered.     Shanell Juárez PA-C       [1] No Known Allergies

## 2025-04-04 ENCOUNTER — PATIENT MESSAGE (OUTPATIENT)
Dept: FAMILY MEDICINE CLINIC | Facility: CLINIC | Age: 50
End: 2025-04-04

## 2025-04-04 DIAGNOSIS — Z95.820 S/P ANGIOPLASTY WITH STENT: Primary | ICD-10-CM

## 2025-04-04 NOTE — TELEPHONE ENCOUNTER
Patient has same insurance . He asked for expedite issue of insurance . His asim with cardiologist is on Wednesday .

## 2025-04-10 ENCOUNTER — OFFICE VISIT (OUTPATIENT)
Dept: FAMILY MEDICINE CLINIC | Facility: CLINIC | Age: 50
End: 2025-04-10
Payer: COMMERCIAL

## 2025-04-10 VITALS
TEMPERATURE: 98 F | SYSTOLIC BLOOD PRESSURE: 118 MMHG | WEIGHT: 170.63 LBS | DIASTOLIC BLOOD PRESSURE: 80 MMHG | HEIGHT: 72 IN | BODY MASS INDEX: 23.11 KG/M2 | OXYGEN SATURATION: 98 % | HEART RATE: 72 BPM

## 2025-04-10 DIAGNOSIS — M25.552 BILATERAL HIP PAIN: ICD-10-CM

## 2025-04-10 DIAGNOSIS — Z76.89 ENCOUNTER FOR INTEGRATIVE MEDICINE VISIT: ICD-10-CM

## 2025-04-10 DIAGNOSIS — M25.551 BILATERAL HIP PAIN: ICD-10-CM

## 2025-04-10 DIAGNOSIS — M25.50 ARTHRALGIA, UNSPECIFIED JOINT: ICD-10-CM

## 2025-04-10 DIAGNOSIS — Z95.820 S/P ANGIOPLASTY WITH STENT: Primary | ICD-10-CM

## 2025-04-10 DIAGNOSIS — E78.5 DYSLIPIDEMIA: ICD-10-CM

## 2025-04-10 DIAGNOSIS — M54.31 SCIATICA OF RIGHT SIDE: ICD-10-CM

## 2025-04-10 DIAGNOSIS — R79.89 LOW TESTOSTERONE: ICD-10-CM

## 2025-04-10 PROCEDURE — 99214 OFFICE O/P EST MOD 30 MIN: CPT | Performed by: STUDENT IN AN ORGANIZED HEALTH CARE EDUCATION/TRAINING PROGRAM

## 2025-04-10 RX ORDER — CHOLESTYRAMINE LIGHT 4 G/5.7G
POWDER, FOR SUSPENSION ORAL
COMMUNITY
Start: 2025-03-20

## 2025-04-10 NOTE — PROGRESS NOTES
Subjective:   Rio Johnson is a 49 year old male who presents for Medication Follow-Up     49-year-old male following up for medication refills.  Meds were already sent to the pharmacy.  He follows up with cardiology status post angioplasty with stent.  Currently on aspirin, Plavix, and losartan.  Will follow-up with cardio in 2 weeks.  He was started on Crestor however discontinued due to lower back pain and muscle achiness.  Cardio considering injectable medications however at this time was started on colestyramine that he has been taking once a day instead of twice a day.  He will have a repeat lipid panel prior to cardio visit.  Cardio prescribed Vascepa however not covered by insurance.    Separately patient on testosterone and anastrozole through integrative medicine.  Looking for a new provider and inquiring if we can help with Rx in the interim.    Patient would like a new referral for his chiropractor as he noted good improvement of lower back and hip pain.    History/Other:    Chief Complaint Reviewed and Verified  No Further Nursing Notes to   Review  Tobacco Reviewed  Allergies Reviewed  Medications Reviewed    Problem List Reviewed  Medical History Reviewed  Surgical History   Reviewed  Family History Reviewed  Social History Reviewed         Tobacco:  He smoked tobacco in the past but quit greater than 12 months ago.  Tobacco Use[1]     Current Medications[2]      Review of Systems:  Review of Systems   Constitutional:  Negative for chills, diaphoresis and fever.   HENT:  Negative for congestion, ear discharge, ear pain, sinus pressure, sinus pain and sore throat.    Eyes:  Negative for pain and discharge.   Respiratory:  Negative for cough, chest tightness, shortness of breath and wheezing.    Cardiovascular:  Negative for chest pain and palpitations.   Gastrointestinal:  Negative for abdominal pain, diarrhea, nausea and vomiting.   Endocrine: Negative for cold intolerance and  heat intolerance.   Genitourinary:  Negative for dysuria, flank pain, frequency and urgency.   Musculoskeletal:  Positive for arthralgias and back pain.   Skin:  Negative for rash.   Neurological:  Negative for dizziness, syncope and headaches.   Psychiatric/Behavioral:  Negative for confusion and hallucinations.        Objective:   /80   Pulse 72   Temp 98.2 °F (36.8 °C)   Ht 6' (1.829 m)   Wt 170 lb 9.6 oz (77.4 kg)   SpO2 98%   BMI 23.14 kg/m²  Estimated body mass index is 23.14 kg/m² as calculated from the following:    Height as of this encounter: 6' (1.829 m).    Weight as of this encounter: 170 lb 9.6 oz (77.4 kg).  Physical Exam  Constitutional:       General: He is not in acute distress.     Appearance: Normal appearance. He is not ill-appearing or toxic-appearing.   HENT:      Head: Normocephalic and atraumatic.   Cardiovascular:      Rate and Rhythm: Normal rate and regular rhythm.      Heart sounds: Normal heart sounds. No murmur heard.     No gallop.   Pulmonary:      Effort: Pulmonary effort is normal. No respiratory distress.      Breath sounds: Normal breath sounds. No stridor. No wheezing, rhonchi or rales.   Abdominal:      General: Bowel sounds are normal.      Palpations: Abdomen is soft.      Tenderness: There is no abdominal tenderness. There is no guarding.   Musculoskeletal:         General: No swelling.      Cervical back: Normal range of motion and neck supple.   Skin:     General: Skin is warm and dry.   Neurological:      General: No focal deficit present.      Mental Status: He is alert and oriented to person, place, and time. Mental status is at baseline.   Psychiatric:         Mood and Affect: Mood normal.         Behavior: Behavior normal.         Thought Content: Thought content normal.         Judgment: Judgment normal.         Assessment & Plan:   1. S/P angioplasty with stent (Primary)  -Continue follow-up with cardiology  -Medication adjustment per cardiology  -We can  refill medications as needed for patient.  Rx already sent to pharmacy.  2. Sciatica of right side  Had good improvement with chiropractor and would like to continue care.   -     Chiropractic Referral - External  3. Bilateral hip pain  As above.  -     Chiropractic Referral - External  4. Arthralgia, unspecified joint  As above.  -     Chiropractic Referral - External  5. Low testosterone  On testosterone and anastrozole through integrative medicine.  Provider left the practice and patient looking for a new one. Inquiring if we can help with Rx in the interim.  -Advised that I can bridge patient for testosterone however unable to refill anastrozole.  -     Specialty Other Referral - In Network  6. Encounter for integrative medicine visit  -     Specialty Other Referral - In Network  7. Dyslipidemia  - Continue follow-up with cardiology.        Return in about 6 months (around 10/10/2025) for Routine physical exam visit.    Carlos A Hu MD, 4/10/2025, 11:29 AM          [1]   Social History  Tobacco Use   Smoking Status Former    Current packs/day: 0.00    Types: Cigarettes   Smokeless Tobacco Never   [2]   Current Outpatient Medications   Medication Sig Dispense Refill    cholestyramine light 4 g Oral Powd Pack DISSOLVE ONE PACKET IN 2 TO 6 OUNCES OF WATER OR NONCARBONATED BEVERAGE TWICE DAILY BEFORE MEALS      testosterone cypionate 200 mg/mL Intramuscular Solution Testosterone 0.4ml every 5 days Each vial is single use only, please discard remaining medication in vial per pharmacist instructions 6 mL 0    clopidogrel 75 MG Oral Tab Take 1 tablet (75 mg total) by mouth daily. 90 tablet 0    losartan 25 MG Oral Tab Take 1 tablet (25 mg total) by mouth daily. 90 tablet 0    anastrozole 1 MG Oral Tab tab Take 0.5 tablets (0.5 mg total) by mouth once a week. TAKE one-HALF TABLET BY MOUTH WEEKLY 6 tablet 0    Needle, Disp, (EASY TOUCH HYPODERMIC NEEDLE) 22G X 1-1/2\" Does not apply Misc Use need for testosterone  injection 50 each 0    Syringe, Disposable, (BD SYRINGE LUER-MIHIR) 1 ML Does not apply Misc USE 1 SYRINGE WITH NEEDLE EVERY 5 DAYS. 50 each 0    Needle, Disp, (BD HYPODERMIC NEEDLE) 18G X 1\" Does not apply Misc Use needles 2 per week for testosterone injection 50 each 0    aspirin 81 MG Oral Tab EC Take 1 tablet (81 mg total) by mouth daily. 30 tablet 0    CUSTOM MEDICATION 22 gauge 1.5 inch needles   Use 1 needle every 5 days 30 each 0    CUSTOM MEDICATION Syringes with needle (0.5 or 1ml)  Use 1 syringe with needle every 5 days. 30 each 0    hydrOXYzine 25 MG Oral Tab Take 1 tablet (25 mg total) by mouth nightly as needed for Anxiety (sleep). 90 tablet 0    ALPRAZolam 0.5 MG Oral Tab Take 1 tablet (0.5 mg total) by mouth nightly as needed. 30 tablet 1    clotrimazole 1 % External Cream Apply 1 Application topically 2 (two) times daily. 28 g 0

## 2025-04-24 ENCOUNTER — PATIENT MESSAGE (OUTPATIENT)
Dept: FAMILY MEDICINE CLINIC | Facility: CLINIC | Age: 50
End: 2025-04-24

## 2025-04-24 DIAGNOSIS — E78.5 DYSLIPIDEMIA: ICD-10-CM

## 2025-04-24 DIAGNOSIS — Z95.820 S/P ANGIOPLASTY WITH STENT: Primary | ICD-10-CM

## 2025-05-03 ENCOUNTER — PATIENT MESSAGE (OUTPATIENT)
Dept: FAMILY MEDICINE CLINIC | Facility: CLINIC | Age: 50
End: 2025-05-03

## 2025-05-03 DIAGNOSIS — R23.8 OTHER SKIN CHANGES: Primary | ICD-10-CM

## 2025-05-19 ENCOUNTER — NURSE TRIAGE (OUTPATIENT)
Dept: FAMILY MEDICINE CLINIC | Facility: CLINIC | Age: 50
End: 2025-05-19

## 2025-05-19 NOTE — TELEPHONE ENCOUNTER
Pt left a message stating that he tested positive fot strep on Friday and was started on penicillin. Pt stated he is not getting better maybe getting worse.      RN left a message for pt to call back.

## 2025-05-19 NOTE — TELEPHONE ENCOUNTER
RN s/w patient    Pt stated he started getting sick on Thursday and went to [facility] on Friday. Pt stated he tested positive for strep and was started on penicillin on Friday. Pt stated his son had strep last week. Pt stated he is not getting better and feels like he is getting worse. Pt stated he is coughing more, his throat is more sore, he's producing more phlegm, has pain to his right ear that comes and goes and still has fevers and body aches.    Advised pt to be seen for re-evaluation and possible change of antibiotic. Offered pt appointment today. Pt declined. Pt asking if a new antibiotic can be ordered. Explained to pt that we can not see the notes from the [facility] that he went to too and that he has new symptoms that should be evaluated. Pt stated he will call or go to the [facility] he went to on Friday instead because it is closer.     Will forward to Dr Hu for review.      Reason for Disposition   Earache also present    Protocols used: Sore Throat-A-OH

## 2025-05-21 ENCOUNTER — OFFICE VISIT (OUTPATIENT)
Dept: FAMILY MEDICINE CLINIC | Facility: CLINIC | Age: 50
End: 2025-05-21
Payer: COMMERCIAL

## 2025-05-21 VITALS
SYSTOLIC BLOOD PRESSURE: 118 MMHG | BODY MASS INDEX: 22.51 KG/M2 | OXYGEN SATURATION: 97 % | WEIGHT: 166.19 LBS | TEMPERATURE: 98 F | HEART RATE: 100 BPM | HEIGHT: 72 IN | DIASTOLIC BLOOD PRESSURE: 72 MMHG

## 2025-05-21 DIAGNOSIS — R05.1 ACUTE COUGH: ICD-10-CM

## 2025-05-21 DIAGNOSIS — J02.0 STREP PHARYNGITIS: Primary | ICD-10-CM

## 2025-05-21 DIAGNOSIS — B99.9 FEVER DUE TO INFECTION: ICD-10-CM

## 2025-05-21 PROCEDURE — 99213 OFFICE O/P EST LOW 20 MIN: CPT | Performed by: STUDENT IN AN ORGANIZED HEALTH CARE EDUCATION/TRAINING PROGRAM

## 2025-05-21 PROCEDURE — 87637 SARSCOV2&INF A&B&RSV AMP PRB: CPT | Performed by: STUDENT IN AN ORGANIZED HEALTH CARE EDUCATION/TRAINING PROGRAM

## 2025-05-21 RX ORDER — PENICILLIN V POTASSIUM 500 MG/1
500 TABLET, FILM COATED ORAL 2 TIMES DAILY
COMMUNITY
Start: 2025-05-16

## 2025-05-21 RX ORDER — ICOSAPENT ETHYL 1 G/1
CAPSULE ORAL
COMMUNITY
Start: 2025-05-18

## 2025-05-21 RX ORDER — BENZONATATE 200 MG/1
CAPSULE ORAL
COMMUNITY
Start: 2025-05-19

## 2025-05-21 NOTE — PROGRESS NOTES
Subjective:   Rio Johnson is a 49 year old male who presents for Cold (Pt stated he is not getting better and feels like he is getting worse. Pt stated he is coughing more, his throat is more sore, he's producing more phlegm, has pain to his right ear that comes and goes and still has fevers and body aches./), Urgent Care F/u (Pt stated he started getting sick on Thursday and went to [facility] on Friday. Pt stated he tested positive for strep and was started on penicillin on Friday. Pt stated his son had strep last week. ), and Eye Problem (Pt states right eye is infected, has been using medication for it and seems to be working )     49-year-old male complaining of fever and cough.  Patient states that 2 Sundays ago his son was sick, diagnosed on 5/11/2025 with otitis media and treated with amoxicillin.  Around 5/14/2025 patient started having a sore throat with bilateral ear pressure and right eye watery discharge.  He went to immediate care on 5/16/2025, tested positive for strep, negative for flu and COVID.  He was started on penicillin V 500 mg.  On Sunday he felt worse with a cough and more discharge from his right eye.  At that time also started having fevers with Tmax 102.7F.  Started applying erythromycin ophthalmic ointment on right eye.  He went back to immediate care and antibiotic was switched to Augmentin.  Benzonatate was also sent for his cough that is sometimes productive which has been helping.  Still notes some fevers at night.  Sore throat somewhat improving.    History/Other:    Chief Complaint Reviewed and Verified  Nursing Notes Reviewed and   Verified  Tobacco Reviewed  Allergies Reviewed  Medications Reviewed    Problem List Reviewed  Medical History Reviewed  Surgical History   Reviewed  Family History Reviewed  Social History Reviewed         Tobacco:  He smoked tobacco in the past but quit greater than 12 months ago.  Tobacco Use[1]     Current Medications[2]      Review  of Systems:  Review of Systems   Constitutional:  Negative for chills, diaphoresis and fever.   HENT:  Positive for sore throat (improving). Negative for congestion, ear discharge, ear pain, sinus pressure and sinus pain.    Eyes:  Negative for pain and discharge.   Respiratory:  Positive for cough. Negative for chest tightness, shortness of breath and wheezing.    Cardiovascular:  Negative for chest pain and palpitations.   Gastrointestinal:  Negative for abdominal pain, diarrhea, nausea and vomiting.   Endocrine: Negative for cold intolerance and heat intolerance.   Genitourinary:  Negative for dysuria, flank pain, frequency and urgency.   Musculoskeletal:  Negative for joint swelling.   Skin:  Negative for rash.   Neurological:  Negative for dizziness, syncope and headaches.   Psychiatric/Behavioral:  Negative for confusion and hallucinations.        Objective:   /72 (BP Location: Right arm, Patient Position: Sitting, Cuff Size: large)   Pulse 100   Temp 98.3 °F (36.8 °C) (Tympanic)   Ht 6' (1.829 m)   Wt 166 lb 3.2 oz (75.4 kg)   SpO2 97%   BMI 22.54 kg/m²  Estimated body mass index is 22.54 kg/m² as calculated from the following:    Height as of this encounter: 6' (1.829 m).    Weight as of this encounter: 166 lb 3.2 oz (75.4 kg).  Physical Exam  Constitutional:       General: He is not in acute distress.     Appearance: Normal appearance. He is not ill-appearing or toxic-appearing.   HENT:      Head: Normocephalic and atraumatic.      Right Ear: Tympanic membrane and ear canal normal.      Left Ear: Tympanic membrane and ear canal normal.      Mouth/Throat:      Mouth: Mucous membranes are moist.      Pharynx: Oropharynx is clear. Posterior oropharyngeal erythema (minimal) present. No oropharyngeal exudate.   Cardiovascular:      Rate and Rhythm: Normal rate and regular rhythm.      Heart sounds: Normal heart sounds. No murmur heard.     No gallop.   Pulmonary:      Effort: Pulmonary effort is  normal. No respiratory distress.      Breath sounds: Normal breath sounds. No stridor. No wheezing, rhonchi or rales.   Abdominal:      General: Bowel sounds are normal.      Palpations: Abdomen is soft.      Tenderness: There is no abdominal tenderness. There is no right CVA tenderness, left CVA tenderness or guarding.   Musculoskeletal:         General: No swelling.      Cervical back: Normal range of motion and neck supple.   Lymphadenopathy:      Cervical: No cervical adenopathy.   Skin:     General: Skin is warm and dry.   Neurological:      General: No focal deficit present.      Mental Status: He is alert and oriented to person, place, and time. Mental status is at baseline.   Psychiatric:         Mood and Affect: Mood normal.         Behavior: Behavior normal.         Thought Content: Thought content normal.         Judgment: Judgment normal.         Assessment & Plan:   1. Strep pharyngitis (Primary)  Was on penicillin V, switched to Augmentin.  -Continue with Augmentin.  -Will do a viral swab to check if any additional viral component contributing to fevers.  2. Acute cough  Getting some relief from benzonatate.  - CPM.  -Chest x-ray if worsening or no improvement.  -     XR CHEST PA + LAT CHEST (CPT=71046); Future; Expected date: 05/21/2025  3. Fever due to infection  - Continue with Augmentin.  -Will do a viral swab to check if any additional viral component contributing to fevers.  -     SARS-COV-22-ALINITY; Future; Expected date: 05/21/2025  -     SARS-COV-22-ALINITY          Return if symptoms worsen or fail to improve.    Carlos A Hu MD, 5/21/2025, 11:10 AM          [1]   Social History  Tobacco Use   Smoking Status Former    Current packs/day: 0.00    Types: Cigarettes   Smokeless Tobacco Never   [2]   Current Outpatient Medications   Medication Sig Dispense Refill    amoxicillin clavulanate 875-125 MG Oral Tab Take 1 tablet by mouth 2 (two) times daily.      benzonatate 200 MG Oral Cap TAKE ONE  CAPSULE BY MOUTH THREE TIMES DAILY AS NEEDED FOR COUGH FOR SEVEN DAYS      Icosapent Ethyl 1 g Oral Cap TAKE TWO CAPSULES BY MOUTH TWICE A DAY WITH FOOD. swallow whole. do not chew, open, dissolve, or crush.      penicillin v potassium 500 MG Oral Tab Take 1 tablet (500 mg total) by mouth 2 (two) times daily.      cholestyramine light 4 g Oral Powd Pack DISSOLVE ONE PACKET IN 2 TO 6 OUNCES OF WATER OR NONCARBONATED BEVERAGE TWICE DAILY BEFORE MEALS      testosterone cypionate 200 mg/mL Intramuscular Solution Testosterone 0.4ml every 5 days Each vial is single use only, please discard remaining medication in vial per pharmacist instructions 6 mL 0    clopidogrel 75 MG Oral Tab Take 1 tablet (75 mg total) by mouth daily. 90 tablet 0    losartan 25 MG Oral Tab Take 1 tablet (25 mg total) by mouth daily. 90 tablet 0    anastrozole 1 MG Oral Tab tab Take 0.5 tablets (0.5 mg total) by mouth once a week. TAKE one-HALF TABLET BY MOUTH WEEKLY 6 tablet 0    Needle, Disp, (EASY TOUCH HYPODERMIC NEEDLE) 22G X 1-1/2\" Does not apply Misc Use need for testosterone injection 50 each 0    Syringe, Disposable, (BD SYRINGE LUER-MIHIR) 1 ML Does not apply Misc USE 1 SYRINGE WITH NEEDLE EVERY 5 DAYS. 50 each 0    Needle, Disp, (BD HYPODERMIC NEEDLE) 18G X 1\" Does not apply Misc Use needles 2 per week for testosterone injection 50 each 0    aspirin 81 MG Oral Tab EC Take 1 tablet (81 mg total) by mouth daily. 30 tablet 0    CUSTOM MEDICATION 22 gauge 1.5 inch needles   Use 1 needle every 5 days 30 each 0    CUSTOM MEDICATION Syringes with needle (0.5 or 1ml)  Use 1 syringe with needle every 5 days. 30 each 0    hydrOXYzine 25 MG Oral Tab Take 1 tablet (25 mg total) by mouth nightly as needed for Anxiety (sleep). 90 tablet 0    ALPRAZolam 0.5 MG Oral Tab Take 1 tablet (0.5 mg total) by mouth nightly as needed. 30 tablet 1    clotrimazole 1 % External Cream Apply 1 Application topically 2 (two) times daily. 28 g 0

## 2025-05-23 ENCOUNTER — HOSPITAL ENCOUNTER (OUTPATIENT)
Dept: GENERAL RADIOLOGY | Age: 50
Discharge: HOME | End: 2025-05-23
Attending: STUDENT IN AN ORGANIZED HEALTH CARE EDUCATION/TRAINING PROGRAM
Payer: COMMERCIAL

## 2025-05-23 DIAGNOSIS — R05.1 ACUTE COUGH: ICD-10-CM

## 2025-05-23 PROCEDURE — 71046 X-RAY EXAM CHEST 2 VIEWS: CPT | Performed by: STUDENT IN AN ORGANIZED HEALTH CARE EDUCATION/TRAINING PROGRAM

## 2025-06-04 ENCOUNTER — PATIENT MESSAGE (OUTPATIENT)
Dept: FAMILY MEDICINE CLINIC | Facility: CLINIC | Age: 50
End: 2025-06-04

## 2025-06-04 DIAGNOSIS — R23.8 OTHER SKIN CHANGES: Primary | ICD-10-CM

## 2025-06-11 ENCOUNTER — OFFICE VISIT (OUTPATIENT)
Dept: DERMATOLOGY CLINIC | Facility: CLINIC | Age: 50
End: 2025-06-11
Payer: COMMERCIAL

## 2025-06-11 ENCOUNTER — LAB ENCOUNTER (OUTPATIENT)
Dept: LAB | Age: 50
End: 2025-06-11
Attending: STUDENT IN AN ORGANIZED HEALTH CARE EDUCATION/TRAINING PROGRAM
Payer: COMMERCIAL

## 2025-06-11 ENCOUNTER — PATIENT MESSAGE (OUTPATIENT)
Dept: DERMATOLOGY CLINIC | Facility: CLINIC | Age: 50
End: 2025-06-11

## 2025-06-11 DIAGNOSIS — D18.01 CHERRY ANGIOMA: ICD-10-CM

## 2025-06-11 DIAGNOSIS — B36.0 TINEA VERSICOLOR: ICD-10-CM

## 2025-06-11 DIAGNOSIS — L81.4 LENTIGINES: ICD-10-CM

## 2025-06-11 DIAGNOSIS — L85.3 XEROSIS CUTIS: ICD-10-CM

## 2025-06-11 DIAGNOSIS — L82.1 SEBORRHEIC KERATOSES: Primary | ICD-10-CM

## 2025-06-11 DIAGNOSIS — L57.8 PHOTOAGING OF SKIN: ICD-10-CM

## 2025-06-11 DIAGNOSIS — D22.9 MULTIPLE BENIGN NEVI: ICD-10-CM

## 2025-06-11 LAB
ESTRADIOL SERPL-MCNC: 29.1 PG/ML (ref ?–39.8)
TSI SER-ACNC: 1.09 UIU/ML (ref 0.55–4.78)
VIT D+METAB SERPL-MCNC: 45.7 NG/ML (ref 30–100)

## 2025-06-11 PROCEDURE — 84443 ASSAY THYROID STIM HORMONE: CPT

## 2025-06-11 PROCEDURE — 82670 ASSAY OF TOTAL ESTRADIOL: CPT

## 2025-06-11 PROCEDURE — 36415 COLL VENOUS BLD VENIPUNCTURE: CPT

## 2025-06-11 PROCEDURE — 99204 OFFICE O/P NEW MOD 45 MIN: CPT | Performed by: STUDENT IN AN ORGANIZED HEALTH CARE EDUCATION/TRAINING PROGRAM

## 2025-06-11 PROCEDURE — 82306 VITAMIN D 25 HYDROXY: CPT

## 2025-06-11 RX ORDER — AMMONIUM LACTATE 12 G/100G
1 LOTION TOPICAL 2 TIMES DAILY
Qty: 400 G | Refills: 11 | Status: SHIPPED | OUTPATIENT
Start: 2025-06-11 | End: 2025-07-11

## 2025-06-11 RX ORDER — TRETINOIN 0.25 MG/G
CREAM TOPICAL
Qty: 20 G | Refills: 11 | Status: SHIPPED | OUTPATIENT
Start: 2025-06-11

## 2025-06-11 RX ORDER — KETOCONAZOLE 20 MG/ML
SHAMPOO, SUSPENSION TOPICAL
Qty: 120 ML | Refills: 11 | Status: SHIPPED | OUTPATIENT
Start: 2025-06-11

## 2025-06-11 NOTE — PROGRESS NOTES
New patient     Referred by:   Carlos A Hu    CHIEF COMPLAINT: FBSE    HISTORY OF PRESENT ILLNESS: .      - No particular lesions of concern.       DERM HISTORY:  History of skin cancer: No  History of melanoma: No    FAMILY HISTORY:  History of melanoma: No    PAST MEDICAL HISTORY:  Past Medical History[1]    REVIEW OF SYSTEMS:  Constitutional: Denies fever, chills, unintentional weight loss.   Skin as per HPI    Medications  Current Medications[2]    PHYSICAL EXAM:  Physician accompanied by chaperone during examination   General: awake, alert, no acute distress  Skin: Skin exam was performed today including the following: head and face, scalp, neck, chest (including breasts and axillae), abdomen, back, bilateral upper extremities, bilateral lower extremities, hands, feet, digits, nails. Pertinent findings include:   - Scattered bright red-purple dome-shaped papules on the trunk and extremities   - Scattered light brown stellate macules on sun exposed sites  - Scattered, evenly colored, round brown macules and papules with regular borders on the trunk and extremities  - Numerous scattered skin-colored and brown, waxy, stuck-on papules and plaques on the trunk and extremities      ASSESSMENT & PLAN:  Pathophysiology of diagnoses discussed with patient.  Therapeutic options reviewed. Risks, benefits, and alternatives discussed with patient. Instructions reviewed at length.    #Lentigines  #Seborrheic keratoses   #Cherry angiomas   - Reassurance provided regarding the benign nature of these lesions.    #Multiple benign nevi  #Skin laxity  - Complete skin exam performed today with no outlier lesions identified   - Reassured patient of benign nature of these lesions.   - Recommend daily photoprotection with broad-spectrum sunscreen, avoidance of sun during peak hours, and sun protective clothing.    - Dermoscopy was used for physical examination of pigmented lesions during today's office visit.    #Tinea versicolor   -  Ketoconazole 2% shampoo to use as a body wash daily for two weeks, then 3 times weekly for maintenance.     #Photoaging and rhytides  - Start tretinoin 0.025% cream cream to be applied in the evening. Patient counseled to apply a thin layer to the entire face starting off 2-3 nights per week and then gradually increasing the frequency to every night as tolerated. Side effects, including skin dryness, redness, and irritation, were reviewed. Patient was notified that if prescribed for cosmetic purposes, this prescription is not likely to be covered by insurance. Discussed that topical retinoids should be avoided during pregnancy.  - Recommend daily sun protection using broad-spectrum sunscreen (at least SPF 30), photoprotective clothing, and other photoprotective measures, such as seeking shade when possible.  - Start lac-hydrin twice daily to the body       Return to clinic: 2-3 years  or sooner if something concerning arises     Chintan Schilling MD    By signing my name below, I, Hilario DUEÑAS MA,  attest that this documentation has been prepared under the direction and in the presence of Cihntan Schilling MD.   Electronically Signed: Hilario DUEÑAS MA, 6/11/2025, 11:42 AM.    I, Chintan Schilling MD,  personally performed the services described in this documentation. All medical record entries made by the scribe were at my direction and in my presence.  I have reviewed the chart and agree that the record reflects my personal performance and is accurate and complete.  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      benzonatate 200 MG Oral Cap TAKE ONE CAPSULE BY MOUTH THREE TIMES DAILY AS NEEDED FOR COUGH FOR SEVEN DAYS      Icosapent Ethyl 1 g Oral Cap TAKE TWO CAPSULES BY MOUTH TWICE A DAY WITH FOOD. swallow whole. do not chew, open, dissolve, or crush.      penicillin v potassium 500 MG Oral Tab Take 1 tablet (500 mg total) by mouth 2 (two) times daily.      cholestyramine light 4 g Oral Powd Pack DISSOLVE ONE PACKET IN 2 TO 6 OUNCES OF WATER OR NONCARBONATED BEVERAGE TWICE DAILY BEFORE MEALS      testosterone cypionate 200 mg/mL Intramuscular Solution Testosterone 0.4ml every 5 days Each vial is single use only, please discard remaining medication in vial per pharmacist instructions 6 mL 0    clopidogrel 75 MG Oral Tab Take 1 tablet (75 mg total) by mouth daily. 90 tablet 0    losartan 25 MG Oral Tab Take 1 tablet (25 mg total) by mouth daily. 90 tablet 0    Needle, Disp, (EASY TOUCH HYPODERMIC NEEDLE) 22G X 1-1/2\" Does not apply Misc Use need for testosterone injection 50 each 0    Syringe, Disposable, (BD SYRINGE LUER-MIHIR) 1 ML Does not apply Misc USE 1 SYRINGE WITH NEEDLE EVERY 5 DAYS. 50 each 0    Needle, Disp, (BD HYPODERMIC NEEDLE) 18G X 1\" Does not apply Misc Use needles 2 per week for testosterone injection 50 each 0    aspirin 81 MG Oral Tab EC Take 1 tablet (81 mg total) by mouth daily. 30 tablet 0    CUSTOM MEDICATION 22 gauge 1.5 inch needles   Use 1 needle every 5 days 30 each 0    CUSTOM MEDICATION Syringes with needle (0.5 or 1ml)  Use 1 syringe with needle every 5 days. 30 each 0    hydrOXYzine 25 MG Oral Tab Take 1 tablet (25 mg total) by mouth nightly as needed for Anxiety (sleep). 90 tablet 0    ALPRAZolam 0.5 MG Oral Tab Take 1 tablet (0.5 mg total) by mouth nightly as needed. 30 tablet 1    clotrimazole 1 % External Cream Apply 1 Application topically 2 (two) times daily. 28 g 0

## 2025-08-01 ENCOUNTER — PATIENT MESSAGE (OUTPATIENT)
Dept: FAMILY MEDICINE CLINIC | Facility: CLINIC | Age: 50
End: 2025-08-01

## 2025-08-01 DIAGNOSIS — R79.89 LOW TESTOSTERONE: ICD-10-CM

## 2025-08-01 RX ORDER — TESTOSTERONE CYPIONATE 200 MG/ML
INJECTION, SOLUTION INTRAMUSCULAR
Qty: 4 ML | Refills: 0 | Status: SHIPPED | OUTPATIENT
Start: 2025-08-01

## 2025-08-08 ENCOUNTER — PATIENT MESSAGE (OUTPATIENT)
Dept: FAMILY MEDICINE CLINIC | Facility: CLINIC | Age: 50
End: 2025-08-08

## 2025-08-08 DIAGNOSIS — R79.89 LOW TESTOSTERONE: Primary | ICD-10-CM

## 2025-08-11 DIAGNOSIS — R79.89 LOW TESTOSTERONE: ICD-10-CM

## 2025-08-11 RX ORDER — TESTOSTERONE CYPIONATE 200 MG/ML
INJECTION, SOLUTION INTRAMUSCULAR
Qty: 4 ML | Refills: 0 | Status: SHIPPED | OUTPATIENT
Start: 2025-08-11

## (undated) DIAGNOSIS — R53.82 CHRONIC FATIGUE: ICD-10-CM

## (undated) DIAGNOSIS — G61.0 GUILLAIN-BARRE SYNDROME (HCC): ICD-10-CM

## (undated) DIAGNOSIS — E78.5 HYPERLIPIDEMIA, UNSPECIFIED HYPERLIPIDEMIA TYPE: ICD-10-CM

## (undated) DIAGNOSIS — E34.8 ENDOCRINE AXIS DYSFUNCTION: Primary | ICD-10-CM

## (undated) DIAGNOSIS — G61.0 AIDP (ACUTE INFLAMMATORY DEMYELINATING POLYNEUROPATHY) (HCC): ICD-10-CM

## (undated) DEVICE — MEDI-VAC NON-CONDUCTIVE SUCTION TUBING 6MM X 1.8M (6FT.) L: Brand: CARDINAL HEALTH

## (undated) DEVICE — 60 ML SYRINGE REGULAR TIP: Brand: MONOJECT

## (undated) DEVICE — SNARE OPTMZ PLPCTM TRP

## (undated) DEVICE — SNARE ENDOSCOPIC 10MM ROUND

## (undated) DEVICE — KIT ENDO ORCAPOD 160/180/190

## (undated) DEVICE — Device: Brand: DUAL NARE NASAL CANNULAE FEMALE LUER CON 7FT O2 TUBE

## (undated) DEVICE — KIT CLEAN ENDOKIT 1.1OZ GOWNX2

## (undated) NOTE — LETTER
August 3, 2023      No Recipients     Patient: Luke Amor   YOB: 1975   Date of Visit: 8/3/2023       Dear Dr. Ledy العلي Recipients: Thank you for referring Anabel Chowdary to me for evaluation. Here is my assessment and plan of care:    Luke Amor is a 49year old male. HPI:     HPI    NP. Pt in today for a complete eye exam and a bump evaluation on the REEMA. Pt complains of a \"bump\" on the REEMA that has been recurring on and off for a few years. Pt states he has been doing warm compresses about 2-3x a day and has been using Erythromycin ointment at bedtime which did help but \"bump\" keeps coming back. Pt's last eye exam was over 10 years ago and doesn't recall where he went. Pt denies any surgeries done to the eyes. Pt has been pre-diabetic for 1 year       Pt's diabetes is controlled by diet  Pt checks BS daily    Pt's last blood sugar was 103  Last HA1C was 5.2 on 7/21/23  Endocrinologist: none        Consult: per Dr. Norma Holland  Last edited by Mal Cervantes OMRAIELOS on 8/3/2023  2:23 PM.        Patient History:  Past Medical History:   Diagnosis Date    Anxiety     [place] ([address]     High cholesterol     Hyperlipidemia     Low testosterone     Non-alcoholic fatty liver disease     Tubular adenoma of colon 2023    repeat CLN in 2030       Surgical History: Luke Amor has a past surgical history that includes hernia surgery; cyst removal; and colonoscopy (N/A, 2/22/2023) (Procedure: COLONOSCOPY;  Surgeon: Gricel Bailey MD;  Location: [facility]).     Family History   Problem Relation Age of Onset    Diabetes Mother     High Cholesterol Mother     High Cholesterol Father     Glaucoma Neg     Macular degeneration Neg        Social History:   Social History     Socioeconomic History    Marital status:    Tobacco Use    Smoking status: Former     Packs/day: 0.00     Years: 0.00     Pack years: 0.00     Types: Cigarettes    Smokeless tobacco: Never   Vaping Use    Vaping Use: Never used   Substance and Sexual Activity    Alcohol use: Never    Drug use: Yes     Types: Cannabis   Other Topics Concern    Special Diet No       Medications:  Current Outpatient Medications   Medication Sig Dispense Refill    rosuvastatin 5 MG Oral Tab Take 1 tablet (5 mg total) by mouth nightly. 90 tablet 0    Cholestyramine 4 g Oral Powd Pack Take 1 packet by mouth daily. 90 each 0    testosterone cypionate 200 mg/mL Intramuscular Solution inject 0.35 ml (70 mg) into the muscle every 5 days, 6 mL 0    Icosapent Ethyl (VASCEPA) 1 g Oral Cap Take 2 capsules by mouth in the morning and 2 capsules before bedtime. 120 capsule 1    Continuous Blood Gluc Sensor (FREESTYLE LENORA 2 SENSOR) Does not apply Misc 1 Units every 14 (fourteen) days. 2 each 0    Continuous Blood Gluc  (FREESTYLE LENORA 2 READER) Does not apply Device 1 Device As Directed. buPROPion  MG Oral Tablet 24 Hr Take 1 tablet (150 mg total) by mouth daily. 90 tablet 0    CUSTOM MEDICATION 22 gauge 1.5 inch needles   Use 1 needle every 5 days 30 each 2    CUSTOM MEDICATION Insulin Syringes with needle (0.5 ml/29 gauge/0.5 inches)  Use 1 syringe/needle daily for 30 days   Disp: 30 or 1 package 30 each 2    ALPRAZolam 0.5 MG Oral Tablet Dispersible Take 1 tablet (0.5 mg total) by mouth nightly as needed for Anxiety.  30 tablet 0       Allergies:  No Known Allergies    ROS:     ROS    Positive for: Endocrine, Eyes  Negative for: Constitutional, Gastrointestinal, Neurological, Skin, Genitourinary, Musculoskeletal, HENT, Cardiovascular, Respiratory, Psychiatric, Allergic/Imm, Heme/Lymph  Last edited by Coco Saldana OT on 8/3/2023  1:58 PM.          PHYSICAL EXAM:     Base Eye Exam       Visual Acuity (Snellen - Linear)         Right Left    Dist sc 20/25 +2 20/25 -3    Near sc 20/30- 20/30-              Tonometry (Icare, 1:54 PM)         Right Left    Pressure 13 14              Pupils         Pupils Right PERRL    Left PERRL              Visual Fields         Left Right     Full Full              Extraocular Movement         Right Left     Full, Ortho Full, Ortho              Dilation       Both eyes: 1.0% Mydriacyl and 2.5% Sumanth Synephrine @ 1:54 PM                  Slit Lamp and Fundus Exam       Slit Lamp Exam         Right Left    Lids/Lashes Meibomian gland dysfunction Meibomian gland dysfunction    Conjunctiva/Sclera Normal Normal    Cornea Clear Clear    Anterior Chamber Deep and quiet Deep and quiet    Iris Normal Normal    Lens Clear Clear    Vitreous Clear Clear              Fundus Exam         Right Left    Disc Good rim Good rim    C/D Ratio 0.1 0.1    Macula Normal Normal    Vessels Normal Normal    Periphery Normal Normal                  Refraction       Wearing Rx       Type: No glasses              Manifest Refraction (Auto)         Sphere Cylinder Axis    Right -0.75 +0.25 100    Left -0.75 +0.50 055   Pt declines refraction, will try OTC readers  NVA checked with +1.50 trial lens  Right eye:20/20, left eye: 20/20  Recommend +1.50 OTC readers                     ASSESSMENT/PLAN:     Diagnoses and Plan:     Diet-controlled diabetes mellitus ([address]  Diet Controlled Diabetes. no background of retinopathy, no signs of neovascularization noted. Discussed ocular and systemic benefits of blood sugar control. Diagnosis and treatment discussed in detail with patient. History of chalazion  Information given to see Dr. Olivia Fontenot for consultation and treatment. Presbyopia of both eyes  Suggest +1.50 OTC reading glasses. No orders of the defined types were placed in this encounter. Meds This Visit:  Requested Prescriptions      No prescriptions requested or ordered in this encounter        Follow up instructions:  Return in about 2 years (around 8/3/2025) for DM Eye Exam; if started on diabetic medication then once a year.  .    8/3/2023  Scribed by: Henrietta Carlos MD        If you have questions, please do not hesitate to call me. I look forward to following Sara Esparza along with you.     Sincerely,        Shaye Marie MD        CC:   No Recipients    Document electronically generated by: Shaye Marie MD